# Patient Record
Sex: FEMALE | Race: WHITE | Employment: OTHER | URBAN - METROPOLITAN AREA
[De-identification: names, ages, dates, MRNs, and addresses within clinical notes are randomized per-mention and may not be internally consistent; named-entity substitution may affect disease eponyms.]

---

## 2017-05-22 ENCOUNTER — HOSPITAL ENCOUNTER (OUTPATIENT)
Dept: WOMENS IMAGING | Age: 72
Discharge: HOME | End: 2017-05-22
Attending: INTERNAL MEDICINE | Admitting: INTERNAL MEDICINE

## 2017-05-22 DIAGNOSIS — M85.9 DISORDER OF BONE DENSITY AND STRUCTURE, UNSPECIFIED: ICD-10-CM

## 2017-05-22 DIAGNOSIS — Z12.31 VISIT FOR SCREENING MAMMOGRAM: ICD-10-CM

## 2019-10-21 ENCOUNTER — TELEPHONE (OUTPATIENT)
Dept: CARDIOLOGY CLINIC | Age: 74
End: 2019-10-21

## 2019-10-29 ENCOUNTER — INITIAL CONSULT (OUTPATIENT)
Dept: CARDIOLOGY CLINIC | Age: 74
End: 2019-10-29
Payer: MEDICARE

## 2019-10-29 VITALS
HEART RATE: 88 BPM | SYSTOLIC BLOOD PRESSURE: 130 MMHG | HEIGHT: 68 IN | BODY MASS INDEX: 44.41 KG/M2 | DIASTOLIC BLOOD PRESSURE: 52 MMHG | WEIGHT: 293 LBS

## 2019-10-29 DIAGNOSIS — R60.0 BILATERAL LEG EDEMA: ICD-10-CM

## 2019-10-29 DIAGNOSIS — R06.02 SOB (SHORTNESS OF BREATH): Primary | ICD-10-CM

## 2019-10-29 PROCEDURE — G8417 CALC BMI ABV UP PARAM F/U: HCPCS | Performed by: INTERNAL MEDICINE

## 2019-10-29 PROCEDURE — 99204 OFFICE O/P NEW MOD 45 MIN: CPT | Performed by: INTERNAL MEDICINE

## 2019-10-29 PROCEDURE — G8427 DOCREV CUR MEDS BY ELIG CLIN: HCPCS | Performed by: INTERNAL MEDICINE

## 2019-10-29 PROCEDURE — 93000 ELECTROCARDIOGRAM COMPLETE: CPT | Performed by: INTERNAL MEDICINE

## 2019-10-29 PROCEDURE — 1090F PRES/ABSN URINE INCON ASSESS: CPT | Performed by: INTERNAL MEDICINE

## 2019-10-29 PROCEDURE — G8484 FLU IMMUNIZE NO ADMIN: HCPCS | Performed by: INTERNAL MEDICINE

## 2019-10-29 RX ORDER — INSULIN GLARGINE 100 [IU]/ML
30 INJECTION, SOLUTION SUBCUTANEOUS DAILY
COMMUNITY
Start: 2019-08-21

## 2019-10-29 SDOH — HEALTH STABILITY: MENTAL HEALTH: HOW OFTEN DO YOU HAVE A DRINK CONTAINING ALCOHOL?: NEVER

## 2019-11-06 ENCOUNTER — PROCEDURE VISIT (OUTPATIENT)
Dept: CARDIOLOGY CLINIC | Age: 74
End: 2019-11-06
Payer: MEDICARE

## 2019-11-06 DIAGNOSIS — R60.0 BILATERAL LEG EDEMA: ICD-10-CM

## 2019-11-06 DIAGNOSIS — R06.02 SOB (SHORTNESS OF BREATH): Primary | ICD-10-CM

## 2019-11-06 DIAGNOSIS — I73.9 PAD (PERIPHERAL ARTERY DISEASE) (HCC): Primary | ICD-10-CM

## 2019-11-06 LAB
LV EF: 58 %
LVEF MODALITY: NORMAL

## 2019-11-06 PROCEDURE — 93925 LOWER EXTREMITY STUDY: CPT | Performed by: INTERNAL MEDICINE

## 2019-11-06 PROCEDURE — 93306 TTE W/DOPPLER COMPLETE: CPT | Performed by: INTERNAL MEDICINE

## 2019-11-06 PROCEDURE — 93922 UPR/L XTREMITY ART 2 LEVELS: CPT | Performed by: INTERNAL MEDICINE

## 2019-11-11 ENCOUNTER — TELEPHONE (OUTPATIENT)
Dept: CARDIOLOGY CLINIC | Age: 74
End: 2019-11-11

## 2019-12-02 ENCOUNTER — OFFICE VISIT (OUTPATIENT)
Dept: CARDIOLOGY CLINIC | Age: 74
End: 2019-12-02
Payer: MEDICARE

## 2019-12-02 ENCOUNTER — TELEPHONE (OUTPATIENT)
Dept: CARDIOLOGY CLINIC | Age: 74
End: 2019-12-02

## 2019-12-02 VITALS
HEIGHT: 68 IN | BODY MASS INDEX: 44.41 KG/M2 | SYSTOLIC BLOOD PRESSURE: 150 MMHG | HEART RATE: 98 BPM | DIASTOLIC BLOOD PRESSURE: 60 MMHG | WEIGHT: 293 LBS

## 2019-12-02 DIAGNOSIS — I73.9 PAD (PERIPHERAL ARTERY DISEASE) (HCC): Primary | ICD-10-CM

## 2019-12-02 PROCEDURE — 1123F ACP DISCUSS/DSCN MKR DOCD: CPT | Performed by: INTERNAL MEDICINE

## 2019-12-02 PROCEDURE — 3017F COLORECTAL CA SCREEN DOC REV: CPT | Performed by: INTERNAL MEDICINE

## 2019-12-02 PROCEDURE — 4040F PNEUMOC VAC/ADMIN/RCVD: CPT | Performed by: INTERNAL MEDICINE

## 2019-12-02 PROCEDURE — G8484 FLU IMMUNIZE NO ADMIN: HCPCS | Performed by: INTERNAL MEDICINE

## 2019-12-02 PROCEDURE — G8399 PT W/DXA RESULTS DOCUMENT: HCPCS | Performed by: INTERNAL MEDICINE

## 2019-12-02 PROCEDURE — 1036F TOBACCO NON-USER: CPT | Performed by: INTERNAL MEDICINE

## 2019-12-02 PROCEDURE — 1090F PRES/ABSN URINE INCON ASSESS: CPT | Performed by: INTERNAL MEDICINE

## 2019-12-02 PROCEDURE — G8427 DOCREV CUR MEDS BY ELIG CLIN: HCPCS | Performed by: INTERNAL MEDICINE

## 2019-12-02 PROCEDURE — 99214 OFFICE O/P EST MOD 30 MIN: CPT | Performed by: INTERNAL MEDICINE

## 2019-12-02 PROCEDURE — G8417 CALC BMI ABV UP PARAM F/U: HCPCS | Performed by: INTERNAL MEDICINE

## 2019-12-02 RX ORDER — CILOSTAZOL 50 MG/1
50 TABLET ORAL 2 TIMES DAILY
Qty: 60 TABLET | Refills: 3 | Status: SHIPPED | OUTPATIENT
Start: 2019-12-02 | End: 2020-03-02 | Stop reason: SDUPTHER

## 2019-12-02 NOTE — TELEPHONE ENCOUNTER
Pt called back needs a later date so her daughter can come. Juliana Angio changed to 2-11-20 @ 10 AM. Pt to come in 2-10-20 to sign papers and then will go have pre-testing done.

## 2020-01-24 ENCOUNTER — TELEPHONE (OUTPATIENT)
Dept: CARDIOLOGY CLINIC | Age: 75
End: 2020-01-24

## 2020-01-28 ENCOUNTER — TELEPHONE (OUTPATIENT)
Dept: CARDIOLOGY CLINIC | Age: 75
End: 2020-01-28

## 2020-02-04 ENCOUNTER — TELEPHONE (OUTPATIENT)
Dept: CARDIOLOGY CLINIC | Age: 75
End: 2020-02-04

## 2020-02-10 ENCOUNTER — HOSPITAL ENCOUNTER (OUTPATIENT)
Dept: GENERAL RADIOLOGY | Age: 75
Discharge: HOME | End: 2020-02-10
Payer: MEDICARE

## 2020-02-10 ENCOUNTER — HOSPITAL ENCOUNTER (OUTPATIENT)
Age: 75
Discharge: HOME | End: 2020-02-10
Payer: MEDICARE

## 2020-02-10 ENCOUNTER — TELEPHONE (OUTPATIENT)
Dept: CARDIOLOGY CLINIC | Age: 75
End: 2020-02-10

## 2020-02-10 LAB
ABO/RH: NORMAL
ANION GAP SERPL CALCULATED.3IONS-SCNC: 14 MMOL/L (ref 4–16)
ANTIBODY SCREEN: NEGATIVE
APTT: 30.1 SECONDS (ref 25.1–37.1)
BASOPHILS ABSOLUTE: 0.1 K/CU MM
BASOPHILS RELATIVE PERCENT: 1 % (ref 0–1)
BUN BLDV-MCNC: 9 MG/DL (ref 6–23)
CALCIUM SERPL-MCNC: 9.7 MG/DL (ref 8.3–10.6)
CHLORIDE BLD-SCNC: 97 MMOL/L (ref 99–110)
CO2: 26 MMOL/L (ref 21–32)
COMMENT: NORMAL
CREAT SERPL-MCNC: 0.6 MG/DL (ref 0.6–1.1)
DIFFERENTIAL TYPE: ABNORMAL
EOSINOPHILS ABSOLUTE: 0.3 K/CU MM
EOSINOPHILS RELATIVE PERCENT: 5 % (ref 0–3)
GFR AFRICAN AMERICAN: >60 ML/MIN/1.73M2
GFR NON-AFRICAN AMERICAN: >60 ML/MIN/1.73M2
GLUCOSE BLD-MCNC: 272 MG/DL (ref 70–99)
HCT VFR BLD CALC: 45.6 % (ref 37–47)
HEMOGLOBIN: 13.9 GM/DL (ref 12.5–16)
IMMATURE NEUTROPHIL %: 0.5 % (ref 0–0.43)
INR BLD: 0.96 INDEX
LYMPHOCYTES ABSOLUTE: 2 K/CU MM
LYMPHOCYTES RELATIVE PERCENT: 32.4 % (ref 24–44)
MCH RBC QN AUTO: 29.4 PG (ref 27–31)
MCHC RBC AUTO-ENTMCNC: 30.5 % (ref 32–36)
MCV RBC AUTO: 96.6 FL (ref 78–100)
MONOCYTES ABSOLUTE: 0.5 K/CU MM
MONOCYTES RELATIVE PERCENT: 7.6 % (ref 0–4)
NUCLEATED RBC %: 0 %
PDW BLD-RTO: 13.3 % (ref 11.7–14.9)
PLATELET # BLD: 176 K/CU MM (ref 140–440)
PMV BLD AUTO: 11.3 FL (ref 7.5–11.1)
POTASSIUM SERPL-SCNC: 4.6 MMOL/L (ref 3.5–5.1)
PROTHROMBIN TIME: 11.6 SECONDS (ref 11.7–14.5)
RBC # BLD: 4.72 M/CU MM (ref 4.2–5.4)
SEGMENTED NEUTROPHILS ABSOLUTE COUNT: 3.2 K/CU MM
SEGMENTED NEUTROPHILS RELATIVE PERCENT: 53.5 % (ref 36–66)
SODIUM BLD-SCNC: 137 MMOL/L (ref 135–145)
TOTAL IMMATURE NEUTOROPHIL: 0.03 K/CU MM
TOTAL NUCLEATED RBC: 0 K/CU MM
WBC # BLD: 6 K/CU MM (ref 4–10.5)

## 2020-02-10 PROCEDURE — 85730 THROMBOPLASTIN TIME PARTIAL: CPT

## 2020-02-10 PROCEDURE — 85610 PROTHROMBIN TIME: CPT

## 2020-02-10 PROCEDURE — 85025 COMPLETE CBC W/AUTO DIFF WBC: CPT

## 2020-02-10 PROCEDURE — 86901 BLOOD TYPING SEROLOGIC RH(D): CPT

## 2020-02-10 PROCEDURE — 36415 COLL VENOUS BLD VENIPUNCTURE: CPT

## 2020-02-10 PROCEDURE — 71046 X-RAY EXAM CHEST 2 VIEWS: CPT

## 2020-02-10 PROCEDURE — 80048 BASIC METABOLIC PNL TOTAL CA: CPT

## 2020-02-10 PROCEDURE — 86900 BLOOD TYPING SEROLOGIC ABO: CPT

## 2020-02-10 PROCEDURE — 86850 RBC ANTIBODY SCREEN: CPT

## 2020-02-11 ENCOUNTER — HOSPITAL ENCOUNTER (OUTPATIENT)
Dept: CARDIAC CATH/INVASIVE PROCEDURES | Age: 75
Discharge: HOME | End: 2020-02-11
Attending: INTERNAL MEDICINE | Admitting: INTERNAL MEDICINE
Payer: MEDICARE

## 2020-02-11 VITALS
WEIGHT: 293 LBS | RESPIRATION RATE: 23 BRPM | OXYGEN SATURATION: 94 % | BODY MASS INDEX: 44.41 KG/M2 | DIASTOLIC BLOOD PRESSURE: 59 MMHG | SYSTOLIC BLOOD PRESSURE: 143 MMHG | HEART RATE: 104 BPM | HEIGHT: 68 IN

## 2020-02-11 LAB — GLUCOSE BLD-MCNC: 229 MG/DL (ref 70–99)

## 2020-02-11 PROCEDURE — 2580000003 HC RX 258: Performed by: INTERNAL MEDICINE

## 2020-02-11 PROCEDURE — 2500000003 HC RX 250 WO HCPCS

## 2020-02-11 PROCEDURE — 75625 CONTRAST EXAM ABDOMINL AORTA: CPT | Performed by: INTERNAL MEDICINE

## 2020-02-11 PROCEDURE — 6360000002 HC RX W HCPCS

## 2020-02-11 PROCEDURE — 6370000000 HC RX 637 (ALT 250 FOR IP): Performed by: INTERNAL MEDICINE

## 2020-02-11 PROCEDURE — 2709999900 HC NON-CHARGEABLE SUPPLY

## 2020-02-11 PROCEDURE — C1887 CATHETER, GUIDING: HCPCS

## 2020-02-11 PROCEDURE — C1894 INTRO/SHEATH, NON-LASER: HCPCS

## 2020-02-11 PROCEDURE — 36200 PLACE CATHETER IN AORTA: CPT | Performed by: INTERNAL MEDICINE

## 2020-02-11 PROCEDURE — 6360000004 HC RX CONTRAST MEDICATION

## 2020-02-11 PROCEDURE — 82962 GLUCOSE BLOOD TEST: CPT

## 2020-02-11 PROCEDURE — 75630 X-RAY AORTA LEG ARTERIES: CPT

## 2020-02-11 PROCEDURE — 75716 ARTERY X-RAYS ARMS/LEGS: CPT | Performed by: INTERNAL MEDICINE

## 2020-02-11 PROCEDURE — C1769 GUIDE WIRE: HCPCS

## 2020-02-11 PROCEDURE — 36200 PLACE CATHETER IN AORTA: CPT

## 2020-02-11 RX ORDER — DIPHENHYDRAMINE HCL 25 MG
25 TABLET ORAL ONCE
Status: COMPLETED | OUTPATIENT
Start: 2020-02-11 | End: 2020-02-11

## 2020-02-11 RX ORDER — SODIUM CHLORIDE 9 MG/ML
INJECTION, SOLUTION INTRAVENOUS CONTINUOUS
Status: DISCONTINUED | OUTPATIENT
Start: 2020-02-11 | End: 2020-02-11 | Stop reason: HOSPADM

## 2020-02-11 RX ORDER — DIAZEPAM 5 MG/1
5 TABLET ORAL ONCE
Status: COMPLETED | OUTPATIENT
Start: 2020-02-11 | End: 2020-02-11

## 2020-02-11 RX ADMIN — DIPHENHYDRAMINE HYDROCHLORIDE 25 MG: 25 TABLET ORAL at 09:34

## 2020-02-11 RX ADMIN — SODIUM CHLORIDE: 9 INJECTION, SOLUTION INTRAVENOUS at 09:34

## 2020-02-11 RX ADMIN — DIAZEPAM 5 MG: 5 TABLET ORAL at 09:34

## 2020-02-11 NOTE — H&P
affect     No results found for: CKTOTAL, CKMB, CKMBINDEX, TROPONINI  BNP:  No results found for: BNP  PT/INR:  No results found for: PTINR        Lab Results   Component Value Date     LABA1C 8.3 (H) 10/22/2013     LABA1C 10.0 (H) 07/24/2013            Lab Results   Component Value Date     CHOL 111 10/22/2013     TRIG 183 (H) 10/22/2013     HDL 39 (L) 10/22/2013     LDLCALC 35 10/22/2013     LDLDIRECT 59 01/23/2013            Lab Results   Component Value Date     ALT 20 10/22/2013     AST 21 10/22/2013      TSH:  No results found for: TSH     Impression:  Esperanza Harrison is a 75 y. o.year old who  has a past medical history of COPD (chronic obstructive pulmonary disease) ([facility] [address] H/O Doppler LOWER ARTERIAL ultrasound, H/O echocardiogram, Hypertension, Polycythemia, Respiratory failure, chronic ([facility] [address] and Type II or unspecified type diabetes mellitus without mention of complication, not stated as uncontrolled. and presents with      Plan:  1. Leg swelling\": recommend compression socks, will gets records from 's office  2. PAD: arterial doppler SHOWED MONOPHASIC left sided blood flow, with AUDREY 0.57, will get lower extremity angiogram, add pletal  3. Mild AS: will watch for  4. Copd: on 24 hrs home oxygen, will get echo to check LV ef and PA pressures  5. HTN: stable  6. DM: stable/  7.  All labs, medications and tests reviewed, continue all other medications of all above

## 2020-02-13 ENCOUNTER — TELEPHONE (OUTPATIENT)
Dept: CARDIOLOGY CLINIC | Age: 75
End: 2020-02-13

## 2020-03-02 ENCOUNTER — OFFICE VISIT (OUTPATIENT)
Dept: CARDIOLOGY CLINIC | Age: 75
End: 2020-03-02
Payer: MEDICARE

## 2020-03-02 VITALS
SYSTOLIC BLOOD PRESSURE: 148 MMHG | HEART RATE: 94 BPM | WEIGHT: 293 LBS | BODY MASS INDEX: 44.41 KG/M2 | DIASTOLIC BLOOD PRESSURE: 68 MMHG | HEIGHT: 68 IN

## 2020-03-02 PROCEDURE — G8427 DOCREV CUR MEDS BY ELIG CLIN: HCPCS | Performed by: INTERNAL MEDICINE

## 2020-03-02 PROCEDURE — 1036F TOBACCO NON-USER: CPT | Performed by: INTERNAL MEDICINE

## 2020-03-02 PROCEDURE — 1090F PRES/ABSN URINE INCON ASSESS: CPT | Performed by: INTERNAL MEDICINE

## 2020-03-02 PROCEDURE — G8417 CALC BMI ABV UP PARAM F/U: HCPCS | Performed by: INTERNAL MEDICINE

## 2020-03-02 PROCEDURE — G8399 PT W/DXA RESULTS DOCUMENT: HCPCS | Performed by: INTERNAL MEDICINE

## 2020-03-02 PROCEDURE — 1123F ACP DISCUSS/DSCN MKR DOCD: CPT | Performed by: INTERNAL MEDICINE

## 2020-03-02 PROCEDURE — G8484 FLU IMMUNIZE NO ADMIN: HCPCS | Performed by: INTERNAL MEDICINE

## 2020-03-02 PROCEDURE — 93000 ELECTROCARDIOGRAM COMPLETE: CPT | Performed by: INTERNAL MEDICINE

## 2020-03-02 PROCEDURE — 4040F PNEUMOC VAC/ADMIN/RCVD: CPT | Performed by: INTERNAL MEDICINE

## 2020-03-02 PROCEDURE — 3017F COLORECTAL CA SCREEN DOC REV: CPT | Performed by: INTERNAL MEDICINE

## 2020-03-02 PROCEDURE — 99214 OFFICE O/P EST MOD 30 MIN: CPT | Performed by: INTERNAL MEDICINE

## 2020-03-02 RX ORDER — CILOSTAZOL 50 MG/1
50 TABLET ORAL 2 TIMES DAILY
Qty: 60 TABLET | Refills: 3 | Status: SHIPPED | OUTPATIENT
Start: 2020-03-02

## 2020-03-02 NOTE — LETTER
CLINICAL STAFF DOCUMENTATION    Ember Bray  1945  [id number]    Have you had any Chest Pain - No    Have you had any Shortness of Breath - No    Have you had any dizziness - No    Have you had any palpitations - No    Do you have any edema - No     Do you have a surgery or procedure scheduled in the near future - Yes  If Yes- DO EKG  If Yes - Who is the surgery with?  Mellisa  Type of surgery Atherectomy   BE SURE TO GIVE THIS INFORMATION to [facility]    Ask patient if they [facility] to [facility] if they are not already signed up    Check to see if we have an E-MAIL on file for the patient    Check medication list thoroughly!!!  BE SURE TO ASK PATIENT IF THEY NEED MEDICATION REFILLS
and/or other life saving measures, if I have a cardiac or respiratory arrest.    ___ I WANT to keep my DNR in effect during my procedure(s) and immediate post-operative recovery period through Phase 2 recovery. (Complete separate refusal form)     This form has been fully explained to me. I understand its contents.       Patients Signature: ___________________________Date: ________  Time: ________    If patient unable to sign, has engaged the [address] [place] [address], is a minor, or has a court-appointed Guardian:  [facility] Representative Name (Print):  ____________________________________      Relationship ([place] one):    Guardian   Parent    Spouse    HCPOA   Child   Sibling  Next-of-Kin Friend    Patients Representative Signature: _______________________________________              Date: ______________  Time: __________    [facility] Witness________________________  Date: ________   Time: _________    Physician/Practitioner _______________________  Date: ________   Time: _________           Revision 2017      Maria Elena Ochoa    PROCEDURE TO SCHEDULE:    ANGIOPLASTY OF LEFT ILIAC ARTERY AND LEFT COMMOM FEMORAL ARTERY    Patient Name: Nadine Rodarte   : 1945   MRN# [id number]    Home Phone Number: [phone]   Weight:    Wt Readings from Last 3 Encounters:   20 (!) 333 lb 12.8 oz (151.4 kg)   20 300 lb (136.1 kg)   19 (!) 338 lb (153.3 kg)        Insurance: Payor: Kayley Loera / Plan: MEDICARE PART A AND B / Product Type: *No Product type* /     Date of Procedure: 3/19/20 Time: 11 Arrival Time: 9 am    Diagnosis:  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

## 2020-03-02 NOTE — PROGRESS NOTES
ultrasound 2019    The Left CFA exhibits calcific plaquing with elevated PSV,  Right ABIs show Mild PAD. The Left AUDREY shows Moderate peripheral arterial disease.  H/O echocardiogram 2019    EF 55-60%, Mild aortic stenosis with mean gradient of 13 mmHg.  Hypertension     PAD (peripheral artery disease) Periph Angio([facility]) 2020     Severe left inflow stenosis of left common iliac and left common  femoral artery, will plan CSI atherectomy.     Polycythemia     Respiratory failure, chronic (HCC)     Type II or unspecified type diabetes mellitus without mention of complication, not stated as uncontrolled      Past Surgical History:   Procedure Laterality Date    GALLBLADDER SURGERY  1971     Family History   Problem Relation Age of Onset    Diabetes Mother     Hypertension Mother     Diabetes Father     Hypertension Father     Heart Attack Father     Diabetes Sister     Hypertension Sister     Breast Cancer Sister     Diabetes Brother     Hypertension Brother     Heart Attack Brother     Cancer Paternal Grandmother      Social History     Tobacco Use    Smoking status: Former Smoker     Packs/day: 0.25     Years: 0.50     Pack years: 0.12     Types: Cigarettes     Last attempt to quit: 2009     Years since quittin.1    Smokeless tobacco: Never Used   Substance Use Topics    Alcohol use: Never     Alcohol/week: 0.0 standard drinks     Frequency: Never      [unfilled]  Review of Systems:   · Constitutional: No Fever or Weight Loss   · Eyes: No Decreased Vision  · ENT: No Headaches, Hearing Loss or Vertigo  · Cardiovascular: No chest pain, dyspnea on exertion, palpitations or loss of consciousness  · Respiratory: No cough or wheezing    · Gastrointestinal: No abdominal pain, appetite loss, blood in stools, constipation, diarrhea or heartburn  · Genitourinary: No dysuria, trouble voiding, or hematuria  · Musculoskeletal:  No gait disturbance, weakness or joint complaints  · Integumentary: No rash or pruritis  · Neurological: No TIA or stroke symptoms  · Psychiatric: No anxiety or depression  · Endocrine: No malaise, fatigue or temperature intolerance  · Hematologic/Lymphatic: No bleeding problems, blood clots or swollen lymph nodes  · Allergic/Immunologic: No nasal congestion or hives  All systems negative except as marked. Objective:  BP (!) 148/68   Pulse 94   Ht 5' 8\" (1.727 m)   Wt (!) 333 lb 12.8 oz (151.4 kg)   BMI 50.75 kg/m²   Wt Readings from Last 3 Encounters:   03/02/20 (!) 333 lb 12.8 oz (151.4 kg)   02/11/20 300 lb (136.1 kg)   12/09/19 (!) 338 lb (153.3 kg)     Body mass index is 50.75 kg/m². GENERAL - Alert, oriented, pleasant, in no apparent distress,normal grooming  HEENT - pupils are reactive to light and accomodation, cornea intact, conjunctive normal color, ears are normal in exam,throat exam in normal, teeth, gum and palate are normal, oral mucosa is normal without any notation of pallor or cyanosis  Neck - Supple. No jugular venous distention noted. No carotid bruits, no apical -carotid delay  Respiratory:  Normal breath sounds, No respiratory distress, No wheezing, No chest tenderness. ,no use of accessory muscles, diaphragm movement is normal  Cardiovascular: (PMI) apex non displaced,no lifts no thrills, no s3,no s4, Normal heart rate, Normal rhythm, No murmurs, No rubs, No gallops.  Carotid arteries pulse and amplitude are normal no bruit, no abdominal bruit noted ( normal abdominal aorta ausculation), femoral arteries pulse and amplitude are normal no bruit, pedal pulses are normal  Femoral pulses have normal amplitude, no bruits   Extremities - No cyanosis, clubbing, or significant edema, no varicose veins    Abdomen - No masses, tenderness, or organomegaly, no hepato-splenomegally, no bruits  Musculoskeletal - No significant edema, no kyphosis or scoliosis, no deformity in any extremity noted, muscle strength and tone are normal  Skin: no ulcer,no scar,no stasis dermatitis   Neurologic - alert oriented times 3,Cranial nerves II through XII are grossly intact. There were no gross focal neurologic abnormalities. All sensory and motor nerves examined and were normal  Psychiatric: normal mood and affect    No results found for: CKTOTAL, CKMB, CKMBINDEX, TROPONINI  BNP:  No results found for: BNP  PT/INR:  No results found for: PTINR  Lab Results   Component Value Date    LABA1C 8.3 (H) 10/22/2013    LABA1C 10.0 (H) 07/24/2013     Lab Results   Component Value Date    CHOL 111 10/22/2013    TRIG 183 (H) 10/22/2013    HDL 39 (L) 10/22/2013    LDLCALC 35 10/22/2013    LDLDIRECT 59 01/23/2013     Lab Results   Component Value Date    ALT 20 10/22/2013    AST 21 10/22/2013     TSH:  No results found for: TSH    Impression:  Rosie Torres is a 75 y. o.year old who  has a past medical history of COPD (chronic obstructive pulmonary disease) ([facility] [address] H/O Doppler LOWER ARTERIAL ultrasound, H/O echocardiogram, Hypertension, PAD (peripheral artery disease) Periph Angio(HCC), Polycythemia, Respiratory failure, chronic ([address] and Type II or unspecified type diabetes mellitus without mention of complication, not stated as uncontrolled. and presents with     Plan:  1. Leg swelling\": recommend compression socks, will gets records from 's office  2. PAD: arterial doppler SHOWED MONOPHASIC left sided blood flow, with AUDREY 0.57, lower extermity angopgram showed severe left iilac stenosis and left common femoral stenosis, will get atherectomy and or cutting balloon angioplast  3. Mild AS: will watch for  4. Copd: on 24 hrs home oxygen, will get echo to check LV ef and PA pressures  5. HTN: stable  6. DM: stable/All labs, medications and tests reviewed, continue all other medications of all above medical condition listed as is.     [unfilled]

## 2020-03-02 NOTE — PROGRESS NOTES
Pt here in office & educated on angioplasty for Dx: stenosis of L Iliac artery & L CFA. , scheduled for 3/19/20  @ 11, w/arrival @ 9, @ [facility]; risks explained; & consents signed. Pt does not need pre testing again per Dr. Bridget Ayala. Instructions given to pt to:  NPO after midnight night before procedure; Call hospital @ [phone] to pre-register; May take rest of morning meds. am of procedure; & pt voiced understanding. Copies of consent, pre-testing orders, & info. sheet given to Arlen.

## 2020-03-03 NOTE — LETTER
[facility]) Informed Consent for Anesthesia/Sedation, Surgery, Invasive Procedures, and other High-risk Interventions and Medication use      *This consent is applicable for 30 days following patient signature*    Procedure(s)   IEmber authorize, Dr. Lexi Baker    and the associate(s) or assistant(s) of his/her choice, to perform the following procedure(s): PERCUTANEOUS TRANSLUMINAL ANGIOPLASTY OF LEFT ILIAC ARTERY AND LEFT COMMON FEMORAL ARTERY       I know that unexpected conditions may require additional or different procedures than those above. I authorize the above named practitioner(s) perform these as necessary and desirable. This is based on the practitioners professional judgment. The above named practitioner has discussed the above procedure(s) with me, including:  ? Potential benefits, including likelihood of success of the procedure(s) goals  ? Risks  ? Side effects, risk of death, and risk of infection  ? Any potential problems that might occur during recuperation or healing post-procedure  ? Reasonable alternatives  ? Risks of NOT performing the procedure(s)    I acknowledge that no warranty or guarantee has been made to the results the procedure(s). I consent to the above named practitioner(s) providing additional services to me as deemed reasonable and necessary, including but not limited to:    ? Use of medications for anesthesia or sedation. ? All anesthesia and sedation carry risks. My practitioner has discussed my anticipated anesthesia and/or sedation and the risks of using, risk of not using, benefits, side effects, and alternatives. ? Use of pathology  ? I authorize [facility]) to dispose of tissues, specimens or organs when pathology is complete. ? Use of radiology  ? A contrast agent may be required for radiology procedures.   My practitioner has advised me of the risks of using, risks of not using, benefits, side effects, and alternatives. ? Observers or use of photography, video/audio recording, or televising of the procedure(s). This is for medical, scientific, or educational purposes. This includes appropriate portions of my body. My identity will not be revealed. ? I consent to release of my social security number and other identifying information to [employer] (FDA), and the supplier/, if I receive tissue, a device, or implant. This is to track the tissue, device, or implant for defect, recall, infection, etc.     ? Use of blood and/or blood products, if needed, through my hospital stay. My practitioner has advised me of the risks of using, risks of not using, benefits, side effects, and alternatives. ___ I do NOT want Blood or Blood products given. (Complete separate  refusal form)    Code Status (sumit one):  ___ I do NOT HAVE a DNR order. I am a Full-code.   I will receive CPR, intubation,  chest compressions, medications, and/or other life saving measures if I have a  cardiac or respiratory arrest.    ___ I have a Do Not Resuscitate (DNR)order.   (sumit one below)  ___  I rescind my DNR for surgery and immediate post-operative period through Phase 2 recovery. This means, for that time period, I will be a Full-code and receive CPR, intubation, chest compressions, medications, and/or other life saving measures, if I have a cardiac or respiratory arrest.    ___ I WANT to keep my DNR in effect during my procedure(s) and immediate post-operative recovery period through Phase 2 recovery. (Complete separate refusal form)     This form has been fully explained to me. I understand its contents.     Patients Signature: ___________________________Date: ________  Time: ________    If patient unable to sign, has engaged the Healthcare Power of , is a minor, or has a court-appointed Guardian:

## 2020-03-04 ENCOUNTER — TELEPHONE (OUTPATIENT)
Dept: CARDIOLOGY CLINIC | Age: 75
End: 2020-03-04

## 2020-03-19 ENCOUNTER — TELEPHONE (OUTPATIENT)
Dept: CARDIOLOGY CLINIC | Age: 75
End: 2020-03-19

## 2020-03-19 NOTE — TELEPHONE ENCOUNTER
Patient called to cancel procedure for 3/24/20 due to COVID 19 concerns. Advised patient we will call her to reschedule once we have all clear. She voiced understanding. Zhanna at [address] [place] [address] Scheduling notified of cancellation, Dr Ute Giraldo notified via perfect serve. Routing to [place] for notification.

## 2021-06-23 ENCOUNTER — HOSPITAL ENCOUNTER (OUTPATIENT)
Dept: CT IMAGING | Age: 76
Discharge: HOME | End: 2021-06-23
Payer: MEDICARE

## 2021-06-23 DIAGNOSIS — E11.65 UNCONTROLLED TYPE 2 DIABETES MELLITUS WITH HYPERGLYCEMIA (HCC): ICD-10-CM

## 2021-06-23 DIAGNOSIS — R42 DIZZINESS AND GIDDINESS: ICD-10-CM

## 2021-06-23 LAB
GFR AFRICAN AMERICAN: >60 ML/MIN/1.73M2
GFR NON-AFRICAN AMERICAN: >60 ML/MIN/1.73M2
POC CREATININE: 0.9 MG/DL (ref 0.6–1.1)

## 2021-06-23 PROCEDURE — 6360000004 HC RX CONTRAST MEDICATION: Performed by: INTERNAL MEDICINE

## 2021-06-23 PROCEDURE — 2580000003 HC RX 258: Performed by: INTERNAL MEDICINE

## 2021-06-23 PROCEDURE — 70470 CT HEAD/BRAIN W/O & W/DYE: CPT

## 2021-06-23 RX ORDER — SODIUM CHLORIDE 0.9 % (FLUSH) 0.9 %
10 SYRINGE (ML) INJECTION PRN
Status: DISCONTINUED | OUTPATIENT
Start: 2021-06-23 | End: 2021-06-24 | Stop reason: HOSPADM

## 2021-06-23 RX ADMIN — SODIUM CHLORIDE, PRESERVATIVE FREE 10 ML: 5 INJECTION INTRAVENOUS at 09:00

## 2021-06-23 RX ADMIN — IOPAMIDOL 75 ML: 755 INJECTION, SOLUTION INTRAVENOUS at 09:07

## 2021-10-15 ENCOUNTER — HOSPITAL ENCOUNTER (OUTPATIENT)
Age: 76
Discharge: HOME | End: 2021-10-15
Payer: MEDICARE

## 2021-10-15 ENCOUNTER — HOSPITAL ENCOUNTER (OUTPATIENT)
Dept: ULTRASOUND IMAGING | Age: 76
Discharge: HOME | End: 2021-10-15
Payer: MEDICARE

## 2021-10-15 ENCOUNTER — HOSPITAL ENCOUNTER (OUTPATIENT)
Dept: GENERAL RADIOLOGY | Age: 76
Discharge: HOME | End: 2021-10-15
Payer: MEDICARE

## 2021-10-15 DIAGNOSIS — M79.605 LEFT LEG PAIN: ICD-10-CM

## 2021-10-15 PROCEDURE — 93971 EXTREMITY STUDY: CPT

## 2021-10-15 PROCEDURE — 73610 X-RAY EXAM OF ANKLE: CPT

## 2022-01-01 ENCOUNTER — OFFICE VISIT (OUTPATIENT)
Dept: CARDIOLOGY CLINIC | Age: 77
End: 2022-01-01
Payer: MEDICARE

## 2022-01-01 ENCOUNTER — HOSPITAL ENCOUNTER (INPATIENT)
Age: 77
LOS: 8 days | DRG: 308 | End: 2022-11-01
Attending: EMERGENCY MEDICINE | Admitting: STUDENT IN AN ORGANIZED HEALTH CARE EDUCATION/TRAINING PROGRAM
Payer: MEDICARE

## 2022-01-01 ENCOUNTER — APPOINTMENT (OUTPATIENT)
Dept: GENERAL RADIOLOGY | Age: 77
DRG: 308 | End: 2022-01-01
Payer: MEDICARE

## 2022-01-01 VITALS
WEIGHT: 293 LBS | SYSTOLIC BLOOD PRESSURE: 110 MMHG | BODY MASS INDEX: 44.41 KG/M2 | DIASTOLIC BLOOD PRESSURE: 62 MMHG | HEIGHT: 68 IN | HEART RATE: 107 BPM | OXYGEN SATURATION: 95 %

## 2022-01-01 VITALS
RESPIRATION RATE: 28 BRPM | HEIGHT: 68 IN | TEMPERATURE: 97.8 F | SYSTOLIC BLOOD PRESSURE: 115 MMHG | OXYGEN SATURATION: 95 % | BODY MASS INDEX: 44.41 KG/M2 | WEIGHT: 293 LBS | DIASTOLIC BLOOD PRESSURE: 68 MMHG | HEART RATE: 68 BPM

## 2022-01-01 DIAGNOSIS — I10 PRIMARY HYPERTENSION: ICD-10-CM

## 2022-01-01 DIAGNOSIS — E66.01 CLASS 3 SEVERE OBESITY DUE TO EXCESS CALORIES WITH SERIOUS COMORBIDITY AND BODY MASS INDEX (BMI) OF 50.0 TO 59.9 IN ADULT (HCC): ICD-10-CM

## 2022-01-01 DIAGNOSIS — I48.91 NEW ONSET A-FIB (HCC): ICD-10-CM

## 2022-01-01 DIAGNOSIS — I48.91 ATRIAL FIBRILLATION WITH RAPID VENTRICULAR RESPONSE (HCC): Primary | ICD-10-CM

## 2022-01-01 DIAGNOSIS — E78.2 MIXED HYPERLIPIDEMIA: ICD-10-CM

## 2022-01-01 DIAGNOSIS — E83.42 HYPOMAGNESEMIA: ICD-10-CM

## 2022-01-01 DIAGNOSIS — E87.70 HYPERVOLEMIA, UNSPECIFIED HYPERVOLEMIA TYPE: ICD-10-CM

## 2022-01-01 DIAGNOSIS — I48.91 ATRIAL FIBRILLATION WITH RVR (HCC): ICD-10-CM

## 2022-01-01 DIAGNOSIS — I73.9 PAD (PERIPHERAL ARTERY DISEASE) (HCC): Primary | ICD-10-CM

## 2022-01-01 DIAGNOSIS — R06.02 SOB (SHORTNESS OF BREATH): ICD-10-CM

## 2022-01-01 LAB
ALBUMIN SERPL-MCNC: 4.1 GM/DL (ref 3.4–5)
ALP BLD-CCNC: 46 IU/L (ref 40–129)
ALT SERPL-CCNC: 10 U/L (ref 10–40)
ANION GAP SERPL CALCULATED.3IONS-SCNC: 11 MMOL/L (ref 4–16)
ANION GAP SERPL CALCULATED.3IONS-SCNC: 11 MMOL/L (ref 4–16)
ANION GAP SERPL CALCULATED.3IONS-SCNC: 12 MMOL/L (ref 4–16)
ANION GAP SERPL CALCULATED.3IONS-SCNC: 12 MMOL/L (ref 4–16)
ANION GAP SERPL CALCULATED.3IONS-SCNC: 7 MMOL/L (ref 4–16)
ANION GAP SERPL CALCULATED.3IONS-SCNC: 9 MMOL/L (ref 4–16)
APTT: 30.4 SECONDS (ref 25.1–37.1)
APTT: 35.1 SECONDS (ref 25.1–37.1)
APTT: 35.4 SECONDS (ref 25.1–37.1)
APTT: 60 SECONDS (ref 25.1–37.1)
APTT: 81.5 SECONDS (ref 25.1–37.1)
AST SERPL-CCNC: 15 IU/L (ref 15–37)
BASOPHILS ABSOLUTE: 0 K/CU MM
BASOPHILS ABSOLUTE: 0.1 K/CU MM
BASOPHILS RELATIVE PERCENT: 0.3 % (ref 0–1)
BASOPHILS RELATIVE PERCENT: 0.5 % (ref 0–1)
BASOPHILS RELATIVE PERCENT: 0.5 % (ref 0–1)
BASOPHILS RELATIVE PERCENT: 0.7 % (ref 0–1)
BILIRUB SERPL-MCNC: 0.7 MG/DL (ref 0–1)
BUN BLDV-MCNC: 10 MG/DL (ref 6–23)
BUN BLDV-MCNC: 11 MG/DL (ref 6–23)
BUN BLDV-MCNC: 11 MG/DL (ref 6–23)
BUN BLDV-MCNC: 14 MG/DL (ref 6–23)
CALCIUM SERPL-MCNC: 8.9 MG/DL (ref 8.3–10.6)
CALCIUM SERPL-MCNC: 9.2 MG/DL (ref 8.3–10.6)
CALCIUM SERPL-MCNC: 9.2 MG/DL (ref 8.3–10.6)
CALCIUM SERPL-MCNC: 9.4 MG/DL (ref 8.3–10.6)
CALCIUM SERPL-MCNC: 9.4 MG/DL (ref 8.3–10.6)
CALCIUM SERPL-MCNC: 9.5 MG/DL (ref 8.3–10.6)
CHLORIDE BLD-SCNC: 100 MMOL/L (ref 99–110)
CHLORIDE BLD-SCNC: 101 MMOL/L (ref 99–110)
CHLORIDE BLD-SCNC: 91 MMOL/L (ref 99–110)
CHLORIDE BLD-SCNC: 91 MMOL/L (ref 99–110)
CHLORIDE BLD-SCNC: 93 MMOL/L (ref 99–110)
CHLORIDE BLD-SCNC: 96 MMOL/L (ref 99–110)
CO2: 25 MMOL/L (ref 21–32)
CO2: 26 MMOL/L (ref 21–32)
CO2: 29 MMOL/L (ref 21–32)
CO2: 32 MMOL/L (ref 21–32)
CO2: 34 MMOL/L (ref 21–32)
CO2: 39 MMOL/L (ref 21–32)
CREAT SERPL-MCNC: 0.7 MG/DL (ref 0.6–1.1)
CREAT SERPL-MCNC: 0.8 MG/DL (ref 0.6–1.1)
CREAT SERPL-MCNC: 0.8 MG/DL (ref 0.6–1.1)
CREAT SERPL-MCNC: 0.9 MG/DL (ref 0.6–1.1)
DIFFERENTIAL TYPE: ABNORMAL
EKG ATRIAL RATE: 163 BPM
EKG ATRIAL RATE: 95 BPM
EKG DIAGNOSIS: NORMAL
EKG DIAGNOSIS: NORMAL
EKG P AXIS: 67 DEGREES
EKG P-R INTERVAL: 260 MS
EKG Q-T INTERVAL: 274 MS
EKG Q-T INTERVAL: 290 MS
EKG QRS DURATION: 64 MS
EKG QRS DURATION: 82 MS
EKG QTC CALCULATION (BAZETT): 344 MS
EKG QTC CALCULATION (BAZETT): 470 MS
EKG R AXIS: 8 DEGREES
EKG R AXIS: 92 DEGREES
EKG T AXIS: 209 DEGREES
EKG T AXIS: 67 DEGREES
EKG VENTRICULAR RATE: 158 BPM
EKG VENTRICULAR RATE: 95 BPM
EOSINOPHILS ABSOLUTE: 0.1 K/CU MM
EOSINOPHILS ABSOLUTE: 0.1 K/CU MM
EOSINOPHILS ABSOLUTE: 0.2 K/CU MM
EOSINOPHILS ABSOLUTE: 0.2 K/CU MM
EOSINOPHILS RELATIVE PERCENT: 1 % (ref 0–3)
EOSINOPHILS RELATIVE PERCENT: 1.5 % (ref 0–3)
EOSINOPHILS RELATIVE PERCENT: 2.6 % (ref 0–3)
EOSINOPHILS RELATIVE PERCENT: 2.7 % (ref 0–3)
ESTIMATED AVERAGE GLUCOSE: 120 MG/DL
GFR SERPL CREATININE-BSD FRML MDRD: >60 ML/MIN/1.73M2
GLUCOSE BLD-MCNC: 118 MG/DL (ref 70–99)
GLUCOSE BLD-MCNC: 129 MG/DL (ref 70–99)
GLUCOSE BLD-MCNC: 138 MG/DL (ref 70–99)
GLUCOSE BLD-MCNC: 140 MG/DL (ref 70–99)
GLUCOSE BLD-MCNC: 142 MG/DL (ref 70–99)
GLUCOSE BLD-MCNC: 143 MG/DL (ref 70–99)
GLUCOSE BLD-MCNC: 146 MG/DL (ref 70–99)
GLUCOSE BLD-MCNC: 147 MG/DL (ref 70–99)
GLUCOSE BLD-MCNC: 147 MG/DL (ref 70–99)
GLUCOSE BLD-MCNC: 148 MG/DL (ref 70–99)
GLUCOSE BLD-MCNC: 149 MG/DL (ref 70–99)
GLUCOSE BLD-MCNC: 149 MG/DL (ref 70–99)
GLUCOSE BLD-MCNC: 150 MG/DL (ref 70–99)
GLUCOSE BLD-MCNC: 151 MG/DL (ref 70–99)
GLUCOSE BLD-MCNC: 156 MG/DL (ref 70–99)
GLUCOSE BLD-MCNC: 156 MG/DL (ref 70–99)
GLUCOSE BLD-MCNC: 159 MG/DL (ref 70–99)
GLUCOSE BLD-MCNC: 159 MG/DL (ref 70–99)
GLUCOSE BLD-MCNC: 160 MG/DL (ref 70–99)
GLUCOSE BLD-MCNC: 161 MG/DL (ref 70–99)
GLUCOSE BLD-MCNC: 162 MG/DL (ref 70–99)
GLUCOSE BLD-MCNC: 164 MG/DL (ref 70–99)
GLUCOSE BLD-MCNC: 164 MG/DL (ref 70–99)
GLUCOSE BLD-MCNC: 171 MG/DL (ref 70–99)
GLUCOSE BLD-MCNC: 175 MG/DL (ref 70–99)
GLUCOSE BLD-MCNC: 175 MG/DL (ref 70–99)
GLUCOSE BLD-MCNC: 178 MG/DL (ref 70–99)
GLUCOSE BLD-MCNC: 181 MG/DL (ref 70–99)
GLUCOSE BLD-MCNC: 184 MG/DL (ref 70–99)
GLUCOSE BLD-MCNC: 185 MG/DL (ref 70–99)
GLUCOSE BLD-MCNC: 186 MG/DL (ref 70–99)
GLUCOSE BLD-MCNC: 188 MG/DL (ref 70–99)
GLUCOSE BLD-MCNC: 189 MG/DL (ref 70–99)
GLUCOSE BLD-MCNC: 190 MG/DL (ref 70–99)
GLUCOSE BLD-MCNC: 194 MG/DL (ref 70–99)
GLUCOSE BLD-MCNC: 195 MG/DL (ref 70–99)
GLUCOSE BLD-MCNC: 198 MG/DL (ref 70–99)
GLUCOSE BLD-MCNC: 201 MG/DL (ref 70–99)
GLUCOSE BLD-MCNC: 209 MG/DL (ref 70–99)
GLUCOSE BLD-MCNC: 211 MG/DL (ref 70–99)
GLUCOSE BLD-MCNC: 220 MG/DL (ref 70–99)
GLUCOSE BLD-MCNC: 229 MG/DL (ref 70–99)
GLUCOSE BLD-MCNC: 235 MG/DL (ref 70–99)
GLUCOSE BLD-MCNC: 239 MG/DL (ref 70–99)
GLUCOSE BLD-MCNC: 239 MG/DL (ref 70–99)
GLUCOSE BLD-MCNC: 352 MG/DL (ref 70–99)
GLUCOSE BLD-MCNC: 571 MG/DL (ref 70–99)
GLUCOSE BLD-MCNC: 83 MG/DL (ref 70–99)
HBA1C MFR BLD: 5.8 % (ref 4.2–6.3)
HCT VFR BLD CALC: 39.6 % (ref 37–47)
HCT VFR BLD CALC: 40.2 % (ref 37–47)
HCT VFR BLD CALC: 40.3 % (ref 37–47)
HCT VFR BLD CALC: 41.3 % (ref 37–47)
HCT VFR BLD CALC: 41.3 % (ref 37–47)
HEMOGLOBIN: 12.4 GM/DL (ref 12.5–16)
HEMOGLOBIN: 12.5 GM/DL (ref 12.5–16)
HEMOGLOBIN: 12.6 GM/DL (ref 12.5–16)
HEMOGLOBIN: 12.6 GM/DL (ref 12.5–16)
HEMOGLOBIN: 12.9 GM/DL (ref 12.5–16)
IMMATURE NEUTROPHIL %: 0.3 % (ref 0–0.43)
IMMATURE NEUTROPHIL %: 0.4 % (ref 0–0.43)
INR BLD: 1.08 INDEX
LV EF: 53 %
LV EF: 58 %
LVEF MODALITY: NORMAL
LVEF MODALITY: NORMAL
LYMPHOCYTES ABSOLUTE: 1.4 K/CU MM
LYMPHOCYTES ABSOLUTE: 1.5 K/CU MM
LYMPHOCYTES ABSOLUTE: 1.6 K/CU MM
LYMPHOCYTES ABSOLUTE: 2 K/CU MM
LYMPHOCYTES RELATIVE PERCENT: 18.4 % (ref 24–44)
LYMPHOCYTES RELATIVE PERCENT: 18.9 % (ref 24–44)
LYMPHOCYTES RELATIVE PERCENT: 20.8 % (ref 24–44)
LYMPHOCYTES RELATIVE PERCENT: 26 % (ref 24–44)
MAGNESIUM: 1.5 MG/DL (ref 1.8–2.4)
MAGNESIUM: 1.6 MG/DL (ref 1.8–2.4)
MAGNESIUM: 1.6 MG/DL (ref 1.8–2.4)
MCH RBC QN AUTO: 28.7 PG (ref 27–31)
MCH RBC QN AUTO: 28.9 PG (ref 27–31)
MCH RBC QN AUTO: 29 PG (ref 27–31)
MCH RBC QN AUTO: 29.4 PG (ref 27–31)
MCH RBC QN AUTO: 29.7 PG (ref 27–31)
MCHC RBC AUTO-ENTMCNC: 30 % (ref 32–36)
MCHC RBC AUTO-ENTMCNC: 31.2 % (ref 32–36)
MCHC RBC AUTO-ENTMCNC: 31.3 % (ref 32–36)
MCHC RBC AUTO-ENTMCNC: 31.3 % (ref 32–36)
MCHC RBC AUTO-ENTMCNC: 31.6 % (ref 32–36)
MCV RBC AUTO: 92.6 FL (ref 78–100)
MCV RBC AUTO: 92.6 FL (ref 78–100)
MCV RBC AUTO: 93.9 FL (ref 78–100)
MCV RBC AUTO: 94.1 FL (ref 78–100)
MCV RBC AUTO: 95.6 FL (ref 78–100)
MONOCYTES ABSOLUTE: 0.7 K/CU MM
MONOCYTES ABSOLUTE: 0.8 K/CU MM
MONOCYTES ABSOLUTE: 0.8 K/CU MM
MONOCYTES ABSOLUTE: 0.9 K/CU MM
MONOCYTES RELATIVE PERCENT: 11.2 % (ref 0–4)
MONOCYTES RELATIVE PERCENT: 12.9 % (ref 0–4)
MONOCYTES RELATIVE PERCENT: 8.7 % (ref 0–4)
MONOCYTES RELATIVE PERCENT: 9.4 % (ref 0–4)
NUCLEATED RBC %: 0 %
PDW BLD-RTO: 14.5 % (ref 11.7–14.9)
PDW BLD-RTO: 14.6 % (ref 11.7–14.9)
PDW BLD-RTO: 14.8 % (ref 11.7–14.9)
PLATELET # BLD: 146 K/CU MM (ref 140–440)
PLATELET # BLD: 157 K/CU MM (ref 140–440)
PLATELET # BLD: 165 K/CU MM (ref 140–440)
PLATELET # BLD: 166 K/CU MM (ref 140–440)
PLATELET # BLD: 172 K/CU MM (ref 140–440)
PMV BLD AUTO: 10.7 FL (ref 7.5–11.1)
PMV BLD AUTO: 10.8 FL (ref 7.5–11.1)
PMV BLD AUTO: 10.8 FL (ref 7.5–11.1)
PMV BLD AUTO: 11 FL (ref 7.5–11.1)
PMV BLD AUTO: 11 FL (ref 7.5–11.1)
POTASSIUM SERPL-SCNC: 3.3 MMOL/L (ref 3.5–5.1)
POTASSIUM SERPL-SCNC: 3.6 MMOL/L (ref 3.5–5.1)
POTASSIUM SERPL-SCNC: 3.8 MMOL/L (ref 3.5–5.1)
POTASSIUM SERPL-SCNC: 3.8 MMOL/L (ref 3.5–5.1)
POTASSIUM SERPL-SCNC: 4.3 MMOL/L (ref 3.5–5.1)
POTASSIUM SERPL-SCNC: 4.3 MMOL/L (ref 3.5–5.1)
PRO-BNP: 612.8 PG/ML
PROTHROMBIN TIME: 13.9 SECONDS (ref 11.7–14.5)
RBC # BLD: 4.21 M/CU MM (ref 4.2–5.4)
RBC # BLD: 4.28 M/CU MM (ref 4.2–5.4)
RBC # BLD: 4.32 M/CU MM (ref 4.2–5.4)
RBC # BLD: 4.35 M/CU MM (ref 4.2–5.4)
RBC # BLD: 4.46 M/CU MM (ref 4.2–5.4)
REASON FOR REJECTION: NORMAL
REASON FOR REJECTION: NORMAL
REJECTED TEST: NORMAL
REJECTED TEST: NORMAL
SEGMENTED NEUTROPHILS ABSOLUTE COUNT: 4.6 K/CU MM
SEGMENTED NEUTROPHILS ABSOLUTE COUNT: 4.7 K/CU MM
SEGMENTED NEUTROPHILS ABSOLUTE COUNT: 5 K/CU MM
SEGMENTED NEUTROPHILS ABSOLUTE COUNT: 6.1 K/CU MM
SEGMENTED NEUTROPHILS RELATIVE PERCENT: 61.7 % (ref 36–66)
SEGMENTED NEUTROPHILS RELATIVE PERCENT: 62.8 % (ref 36–66)
SEGMENTED NEUTROPHILS RELATIVE PERCENT: 68 % (ref 36–66)
SEGMENTED NEUTROPHILS RELATIVE PERCENT: 70.1 % (ref 36–66)
SODIUM BLD-SCNC: 134 MMOL/L (ref 135–145)
SODIUM BLD-SCNC: 136 MMOL/L (ref 135–145)
SODIUM BLD-SCNC: 136 MMOL/L (ref 135–145)
SODIUM BLD-SCNC: 137 MMOL/L (ref 135–145)
SODIUM BLD-SCNC: 137 MMOL/L (ref 135–145)
SODIUM BLD-SCNC: 139 MMOL/L (ref 135–145)
T4 FREE: 1.75 NG/DL (ref 0.9–1.8)
TOTAL IMMATURE NEUTOROPHIL: 0.02 K/CU MM
TOTAL IMMATURE NEUTOROPHIL: 0.02 K/CU MM
TOTAL IMMATURE NEUTOROPHIL: 0.03 K/CU MM
TOTAL IMMATURE NEUTOROPHIL: 0.03 K/CU MM
TOTAL NUCLEATED RBC: 0 K/CU MM
TOTAL PROTEIN: 6.9 GM/DL (ref 6.4–8.2)
TROPONIN T: <0.01 NG/ML
TSH HIGH SENSITIVITY: 0.79 UIU/ML (ref 0.27–4.2)
WBC # BLD: 7.3 K/CU MM (ref 4–10.5)
WBC # BLD: 7.3 K/CU MM (ref 4–10.5)
WBC # BLD: 7.6 K/CU MM (ref 4–10.5)
WBC # BLD: 7.9 K/CU MM (ref 4–10.5)
WBC # BLD: 8.7 K/CU MM (ref 4–10.5)

## 2022-01-01 PROCEDURE — 2700000000 HC OXYGEN THERAPY PER DAY

## 2022-01-01 PROCEDURE — 97116 GAIT TRAINING THERAPY: CPT

## 2022-01-01 PROCEDURE — 6370000000 HC RX 637 (ALT 250 FOR IP): Performed by: STUDENT IN AN ORGANIZED HEALTH CARE EDUCATION/TRAINING PROGRAM

## 2022-01-01 PROCEDURE — 36415 COLL VENOUS BLD VENIPUNCTURE: CPT

## 2022-01-01 PROCEDURE — 1090F PRES/ABSN URINE INCON ASSESS: CPT | Performed by: NURSE PRACTITIONER

## 2022-01-01 PROCEDURE — 6370000000 HC RX 637 (ALT 250 FOR IP): Performed by: INTERNAL MEDICINE

## 2022-01-01 PROCEDURE — 82962 GLUCOSE BLOOD TEST: CPT

## 2022-01-01 PROCEDURE — 7100000000 HC PACU RECOVERY - FIRST 15 MIN

## 2022-01-01 PROCEDURE — 71045 X-RAY EXAM CHEST 1 VIEW: CPT

## 2022-01-01 PROCEDURE — 94761 N-INVAS EAR/PLS OXIMETRY MLT: CPT

## 2022-01-01 PROCEDURE — 6370000000 HC RX 637 (ALT 250 FOR IP): Performed by: NURSE PRACTITIONER

## 2022-01-01 PROCEDURE — 96376 TX/PRO/DX INJ SAME DRUG ADON: CPT

## 2022-01-01 PROCEDURE — 93010 ELECTROCARDIOGRAM REPORT: CPT | Performed by: INTERNAL MEDICINE

## 2022-01-01 PROCEDURE — 99232 SBSQ HOSP IP/OBS MODERATE 35: CPT | Performed by: INTERNAL MEDICINE

## 2022-01-01 PROCEDURE — 2500000003 HC RX 250 WO HCPCS: Performed by: NURSE PRACTITIONER

## 2022-01-01 PROCEDURE — 93306 TTE W/DOPPLER COMPLETE: CPT

## 2022-01-01 PROCEDURE — G8427 DOCREV CUR MEDS BY ELIG CLIN: HCPCS | Performed by: NURSE PRACTITIONER

## 2022-01-01 PROCEDURE — 80053 COMPREHEN METABOLIC PANEL: CPT

## 2022-01-01 PROCEDURE — APPSS45 APP SPLIT SHARED TIME 31-45 MINUTES: Performed by: NURSE PRACTITIONER

## 2022-01-01 PROCEDURE — 93005 ELECTROCARDIOGRAM TRACING: CPT | Performed by: NURSE PRACTITIONER

## 2022-01-01 PROCEDURE — 85025 COMPLETE CBC W/AUTO DIFF WBC: CPT

## 2022-01-01 PROCEDURE — 2580000003 HC RX 258: Performed by: NURSE PRACTITIONER

## 2022-01-01 PROCEDURE — 92950 HEART/LUNG RESUSCITATION CPR: CPT

## 2022-01-01 PROCEDURE — APPSS60 APP SPLIT SHARED TIME 46-60 MINUTES: Performed by: NURSE PRACTITIONER

## 2022-01-01 PROCEDURE — 99221 1ST HOSP IP/OBS SF/LOW 40: CPT | Performed by: INTERNAL MEDICINE

## 2022-01-01 PROCEDURE — 5A2204Z RESTORATION OF CARDIAC RHYTHM, SINGLE: ICD-10-PCS | Performed by: INTERNAL MEDICINE

## 2022-01-01 PROCEDURE — 2060000000 HC ICU INTERMEDIATE R&B

## 2022-01-01 PROCEDURE — 96375 TX/PRO/DX INJ NEW DRUG ADDON: CPT

## 2022-01-01 PROCEDURE — 93005 ELECTROCARDIOGRAM TRACING: CPT | Performed by: EMERGENCY MEDICINE

## 2022-01-01 PROCEDURE — G8417 CALC BMI ABV UP PARAM F/U: HCPCS | Performed by: NURSE PRACTITIONER

## 2022-01-01 PROCEDURE — 1123F ACP DISCUSS/DSCN MKR DOCD: CPT | Performed by: NURSE PRACTITIONER

## 2022-01-01 PROCEDURE — 99233 SBSQ HOSP IP/OBS HIGH 50: CPT | Performed by: INTERNAL MEDICINE

## 2022-01-01 PROCEDURE — 97530 THERAPEUTIC ACTIVITIES: CPT

## 2022-01-01 PROCEDURE — 94664 DEMO&/EVAL PT USE INHALER: CPT

## 2022-01-01 PROCEDURE — 80048 BASIC METABOLIC PNL TOTAL CA: CPT

## 2022-01-01 PROCEDURE — 94640 AIRWAY INHALATION TREATMENT: CPT

## 2022-01-01 PROCEDURE — 6360000002 HC RX W HCPCS: Performed by: STUDENT IN AN ORGANIZED HEALTH CARE EDUCATION/TRAINING PROGRAM

## 2022-01-01 PROCEDURE — 85730 THROMBOPLASTIN TIME PARTIAL: CPT

## 2022-01-01 PROCEDURE — 84439 ASSAY OF FREE THYROXINE: CPT

## 2022-01-01 PROCEDURE — 93312 ECHO TRANSESOPHAGEAL: CPT

## 2022-01-01 PROCEDURE — 1036F TOBACCO NON-USER: CPT | Performed by: NURSE PRACTITIONER

## 2022-01-01 PROCEDURE — 31500 INSERT EMERGENCY AIRWAY: CPT | Performed by: NURSE ANESTHETIST, CERTIFIED REGISTERED

## 2022-01-01 PROCEDURE — 2500000003 HC RX 250 WO HCPCS: Performed by: EMERGENCY MEDICINE

## 2022-01-01 PROCEDURE — 93000 ELECTROCARDIOGRAM COMPLETE: CPT | Performed by: NURSE PRACTITIONER

## 2022-01-01 PROCEDURE — 94660 CPAP INITIATION&MGMT: CPT

## 2022-01-01 PROCEDURE — 85027 COMPLETE CBC AUTOMATED: CPT

## 2022-01-01 PROCEDURE — 99223 1ST HOSP IP/OBS HIGH 75: CPT | Performed by: INTERNAL MEDICINE

## 2022-01-01 PROCEDURE — 92960 CARDIOVERSION ELECTRIC EXT: CPT

## 2022-01-01 PROCEDURE — G8484 FLU IMMUNIZE NO ADMIN: HCPCS | Performed by: NURSE PRACTITIONER

## 2022-01-01 PROCEDURE — 83735 ASSAY OF MAGNESIUM: CPT

## 2022-01-01 PROCEDURE — 96365 THER/PROPH/DIAG IV INF INIT: CPT

## 2022-01-01 PROCEDURE — 93005 ELECTROCARDIOGRAM TRACING: CPT | Performed by: INTERNAL MEDICINE

## 2022-01-01 PROCEDURE — 97535 SELF CARE MNGMENT TRAINING: CPT

## 2022-01-01 PROCEDURE — 83036 HEMOGLOBIN GLYCOSYLATED A1C: CPT

## 2022-01-01 PROCEDURE — 96366 THER/PROPH/DIAG IV INF ADDON: CPT

## 2022-01-01 PROCEDURE — 99215 OFFICE O/P EST HI 40 MIN: CPT | Performed by: NURSE PRACTITIONER

## 2022-01-01 PROCEDURE — 2500000003 HC RX 250 WO HCPCS

## 2022-01-01 PROCEDURE — 31500 INSERT EMERGENCY AIRWAY: CPT

## 2022-01-01 PROCEDURE — 96368 THER/DIAG CONCURRENT INF: CPT

## 2022-01-01 PROCEDURE — 84443 ASSAY THYROID STIM HORMONE: CPT

## 2022-01-01 PROCEDURE — 6360000002 HC RX W HCPCS: Performed by: INTERNAL MEDICINE

## 2022-01-01 PROCEDURE — 99285 EMERGENCY DEPT VISIT HI MDM: CPT

## 2022-01-01 PROCEDURE — 6360000002 HC RX W HCPCS: Performed by: EMERGENCY MEDICINE

## 2022-01-01 PROCEDURE — 6360000002 HC RX W HCPCS

## 2022-01-01 PROCEDURE — 97166 OT EVAL MOD COMPLEX 45 MIN: CPT

## 2022-01-01 PROCEDURE — 84484 ASSAY OF TROPONIN QUANT: CPT

## 2022-01-01 PROCEDURE — 7100000001 HC PACU RECOVERY - ADDTL 15 MIN

## 2022-01-01 PROCEDURE — 83880 ASSAY OF NATRIURETIC PEPTIDE: CPT

## 2022-01-01 PROCEDURE — 85610 PROTHROMBIN TIME: CPT

## 2022-01-01 PROCEDURE — APPNB60 APP NON BILLABLE TIME 46-60 MINS: Performed by: NURSE PRACTITIONER

## 2022-01-01 PROCEDURE — 89220 SPUTUM SPECIMEN COLLECTION: CPT

## 2022-01-01 PROCEDURE — 97162 PT EVAL MOD COMPLEX 30 MIN: CPT

## 2022-01-01 PROCEDURE — G8399 PT W/DXA RESULTS DOCUMENT: HCPCS | Performed by: NURSE PRACTITIONER

## 2022-01-01 PROCEDURE — 6360000002 HC RX W HCPCS: Performed by: NURSE PRACTITIONER

## 2022-01-01 PROCEDURE — 2500000003 HC RX 250 WO HCPCS: Performed by: INTERNAL MEDICINE

## 2022-01-01 RX ORDER — AMIODARONE HYDROCHLORIDE 50 MG/ML
INJECTION, SOLUTION INTRAVENOUS
Status: COMPLETED | OUTPATIENT
Start: 2022-01-01 | End: 2022-01-01

## 2022-01-01 RX ORDER — SODIUM CHLORIDE 0.9 % (FLUSH) 0.9 %
5-40 SYRINGE (ML) INJECTION PRN
Status: DISCONTINUED | OUTPATIENT
Start: 2022-01-01 | End: 2022-11-02 | Stop reason: HOSPADM

## 2022-01-01 RX ORDER — FUROSEMIDE 40 MG/1
40 TABLET ORAL DAILY
Status: ON HOLD | COMMUNITY
End: 2022-01-01 | Stop reason: HOSPADM

## 2022-01-01 RX ORDER — INSULIN GLARGINE 100 [IU]/ML
30 INJECTION, SOLUTION SUBCUTANEOUS DAILY
Status: DISCONTINUED | OUTPATIENT
Start: 2022-01-01 | End: 2022-11-02 | Stop reason: HOSPADM

## 2022-01-01 RX ORDER — AMIODARONE HYDROCHLORIDE 400 MG/1
400 TABLET ORAL 2 TIMES DAILY
Qty: 60 TABLET | Refills: 0 | Status: SHIPPED | OUTPATIENT
Start: 2022-01-01 | End: 2022-11-28

## 2022-01-01 RX ORDER — AMIODARONE HYDROCHLORIDE 200 MG/1
400 TABLET ORAL 2 TIMES DAILY
Status: DISCONTINUED | OUTPATIENT
Start: 2022-01-01 | End: 2022-01-01

## 2022-01-01 RX ORDER — ALBUTEROL SULFATE 90 UG/1
2 AEROSOL, METERED RESPIRATORY (INHALATION) EVERY 6 HOURS PRN
Status: DISCONTINUED | OUTPATIENT
Start: 2022-01-01 | End: 2022-11-02 | Stop reason: HOSPADM

## 2022-01-01 RX ORDER — SODIUM CHLORIDE 9 MG/ML
INJECTION, SOLUTION INTRAVENOUS PRN
Status: DISCONTINUED | OUTPATIENT
Start: 2022-01-01 | End: 2022-11-02 | Stop reason: HOSPADM

## 2022-01-01 RX ORDER — ROSUVASTATIN CALCIUM 20 MG/1
40 TABLET, COATED ORAL DAILY
Status: DISCONTINUED | OUTPATIENT
Start: 2022-01-01 | End: 2022-11-02 | Stop reason: HOSPADM

## 2022-01-01 RX ORDER — TORSEMIDE 20 MG/1
40 TABLET ORAL DAILY
Status: DISCONTINUED | OUTPATIENT
Start: 2022-01-01 | End: 2022-11-02 | Stop reason: HOSPADM

## 2022-01-01 RX ORDER — POTASSIUM CHLORIDE 20 MEQ/1
40 TABLET, EXTENDED RELEASE ORAL ONCE
Status: COMPLETED | OUTPATIENT
Start: 2022-01-01 | End: 2022-01-01

## 2022-01-01 RX ORDER — MAGNESIUM SULFATE 1 G/100ML
1000 INJECTION INTRAVENOUS ONCE
Status: COMPLETED | OUTPATIENT
Start: 2022-01-01 | End: 2022-01-01

## 2022-01-01 RX ORDER — EPINEPHRINE 0.1 MG/ML
SYRINGE (ML) INJECTION
Status: COMPLETED | OUTPATIENT
Start: 2022-01-01 | End: 2022-01-01

## 2022-01-01 RX ORDER — DILTIAZEM HYDROCHLORIDE 5 MG/ML
10 INJECTION INTRAVENOUS ONCE
Status: COMPLETED | OUTPATIENT
Start: 2022-01-01 | End: 2022-01-01

## 2022-01-01 RX ORDER — DIGOXIN 250 MCG
500 TABLET ORAL ONCE
Status: COMPLETED | OUTPATIENT
Start: 2022-01-01 | End: 2022-01-01

## 2022-01-01 RX ORDER — MAGNESIUM SULFATE HEPTAHYDRATE 500 MG/ML
INJECTION, SOLUTION INTRAMUSCULAR; INTRAVENOUS
Status: COMPLETED | OUTPATIENT
Start: 2022-01-01 | End: 2022-01-01

## 2022-01-01 RX ORDER — ONDANSETRON 4 MG/1
4 TABLET, ORALLY DISINTEGRATING ORAL EVERY 8 HOURS PRN
Status: DISCONTINUED | OUTPATIENT
Start: 2022-01-01 | End: 2022-11-02 | Stop reason: HOSPADM

## 2022-01-01 RX ORDER — DEXTROSE MONOHYDRATE 100 MG/ML
INJECTION, SOLUTION INTRAVENOUS CONTINUOUS PRN
Status: DISCONTINUED | OUTPATIENT
Start: 2022-01-01 | End: 2022-11-02 | Stop reason: HOSPADM

## 2022-01-01 RX ORDER — LEVOTHYROXINE SODIUM 0.1 MG/1
200 TABLET ORAL DAILY
Status: DISCONTINUED | OUTPATIENT
Start: 2022-01-01 | End: 2022-11-02 | Stop reason: HOSPADM

## 2022-01-01 RX ORDER — FUROSEMIDE 10 MG/ML
40 INJECTION INTRAMUSCULAR; INTRAVENOUS 2 TIMES DAILY
Status: DISCONTINUED | OUTPATIENT
Start: 2022-01-01 | End: 2022-01-01

## 2022-01-01 RX ORDER — ONDANSETRON 2 MG/ML
4 INJECTION INTRAMUSCULAR; INTRAVENOUS EVERY 6 HOURS PRN
Status: DISCONTINUED | OUTPATIENT
Start: 2022-01-01 | End: 2022-11-02 | Stop reason: HOSPADM

## 2022-01-01 RX ORDER — LANOLIN ALCOHOL/MO/W.PET/CERES
400 CREAM (GRAM) TOPICAL DAILY
Qty: 30 TABLET | Refills: 2 | Status: SHIPPED | OUTPATIENT
Start: 2022-01-01 | End: 2022-11-29

## 2022-01-01 RX ORDER — METOPROLOL SUCCINATE 25 MG/1
25 TABLET, EXTENDED RELEASE ORAL DAILY
Status: DISCONTINUED | OUTPATIENT
Start: 2022-11-02 | End: 2022-11-02 | Stop reason: HOSPADM

## 2022-01-01 RX ORDER — METOPROLOL SUCCINATE 50 MG/1
50 TABLET, EXTENDED RELEASE ORAL DAILY
Status: DISCONTINUED | OUTPATIENT
Start: 2022-01-01 | End: 2022-01-01

## 2022-01-01 RX ORDER — DIGOXIN 125 MCG
125 TABLET ORAL DAILY
Qty: 30 TABLET | Refills: 3 | Status: SHIPPED | OUTPATIENT
Start: 2022-01-01

## 2022-01-01 RX ORDER — HEPARIN SODIUM 1000 [USP'U]/ML
4000 INJECTION, SOLUTION INTRAVENOUS; SUBCUTANEOUS ONCE
Status: COMPLETED | OUTPATIENT
Start: 2022-01-01 | End: 2022-01-01

## 2022-01-01 RX ORDER — INSULIN LISPRO 100 [IU]/ML
0-8 INJECTION, SOLUTION INTRAVENOUS; SUBCUTANEOUS
Status: DISCONTINUED | OUTPATIENT
Start: 2022-01-01 | End: 2022-11-02 | Stop reason: HOSPADM

## 2022-01-01 RX ORDER — GLIPIZIDE 5 MG/1
10 TABLET ORAL
Status: DISCONTINUED | OUTPATIENT
Start: 2022-01-01 | End: 2022-11-02 | Stop reason: HOSPADM

## 2022-01-01 RX ORDER — LANOLIN ALCOHOL/MO/W.PET/CERES
400 CREAM (GRAM) TOPICAL DAILY
Status: DISCONTINUED | OUTPATIENT
Start: 2022-01-01 | End: 2022-11-02 | Stop reason: HOSPADM

## 2022-01-01 RX ORDER — INSULIN LISPRO 100 [IU]/ML
2 INJECTION, SOLUTION INTRAVENOUS; SUBCUTANEOUS ONCE
Status: COMPLETED | OUTPATIENT
Start: 2022-01-01 | End: 2022-01-01

## 2022-01-01 RX ORDER — FUROSEMIDE 40 MG/1
40 TABLET ORAL DAILY
Status: DISCONTINUED | OUTPATIENT
Start: 2022-01-01 | End: 2022-01-01

## 2022-01-01 RX ORDER — CILOSTAZOL 100 MG/1
50 TABLET ORAL 2 TIMES DAILY
Status: DISCONTINUED | OUTPATIENT
Start: 2022-01-01 | End: 2022-11-02 | Stop reason: HOSPADM

## 2022-01-01 RX ORDER — SODIUM CHLORIDE 0.9 % (FLUSH) 0.9 %
5-40 SYRINGE (ML) INJECTION EVERY 12 HOURS SCHEDULED
Status: DISCONTINUED | OUTPATIENT
Start: 2022-01-01 | End: 2022-11-02 | Stop reason: HOSPADM

## 2022-01-01 RX ORDER — FLUMAZENIL 0.1 MG/ML
0.2 INJECTION INTRAVENOUS ONCE
Status: COMPLETED | OUTPATIENT
Start: 2022-01-01 | End: 2022-01-01

## 2022-01-01 RX ORDER — INSULIN LISPRO 100 [IU]/ML
0-4 INJECTION, SOLUTION INTRAVENOUS; SUBCUTANEOUS NIGHTLY
Status: DISCONTINUED | OUTPATIENT
Start: 2022-01-01 | End: 2022-01-01

## 2022-01-01 RX ORDER — BUDESONIDE AND FORMOTEROL FUMARATE DIHYDRATE 80; 4.5 UG/1; UG/1
2 AEROSOL RESPIRATORY (INHALATION) 2 TIMES DAILY
Status: DISCONTINUED | OUTPATIENT
Start: 2022-01-01 | End: 2022-11-02 | Stop reason: HOSPADM

## 2022-01-01 RX ORDER — INSULIN LISPRO 100 [IU]/ML
0-4 INJECTION, SOLUTION INTRAVENOUS; SUBCUTANEOUS
Status: DISCONTINUED | OUTPATIENT
Start: 2022-01-01 | End: 2022-01-01

## 2022-01-01 RX ORDER — DILTIAZEM HYDROCHLORIDE 60 MG/1
60 TABLET, FILM COATED ORAL EVERY 6 HOURS SCHEDULED
Status: DISCONTINUED | OUTPATIENT
Start: 2022-01-01 | End: 2022-01-01

## 2022-01-01 RX ORDER — AMIODARONE HYDROCHLORIDE 200 MG/1
200 TABLET ORAL 2 TIMES DAILY
Status: DISCONTINUED | OUTPATIENT
Start: 2022-11-03 | End: 2022-11-02 | Stop reason: HOSPADM

## 2022-01-01 RX ORDER — DIGOXIN 250 MCG
250 TABLET ORAL ONCE
Status: COMPLETED | OUTPATIENT
Start: 2022-01-01 | End: 2022-01-01

## 2022-01-01 RX ORDER — ACETAMINOPHEN 650 MG/1
650 SUPPOSITORY RECTAL EVERY 6 HOURS PRN
Status: DISCONTINUED | OUTPATIENT
Start: 2022-01-01 | End: 2022-11-02 | Stop reason: HOSPADM

## 2022-01-01 RX ORDER — POTASSIUM CHLORIDE 20 MEQ/1
40 TABLET, EXTENDED RELEASE ORAL 2 TIMES DAILY WITH MEALS
Status: DISCONTINUED | OUTPATIENT
Start: 2022-01-01 | End: 2022-01-01

## 2022-01-01 RX ORDER — MAGNESIUM SULFATE IN WATER 40 MG/ML
2000 INJECTION, SOLUTION INTRAVENOUS ONCE
Status: COMPLETED | OUTPATIENT
Start: 2022-01-01 | End: 2022-01-01

## 2022-01-01 RX ORDER — DEXTROSE MONOHYDRATE 25 G/50ML
INJECTION, SOLUTION INTRAVENOUS
Status: COMPLETED | OUTPATIENT
Start: 2022-01-01 | End: 2022-01-01

## 2022-01-01 RX ORDER — LISINOPRIL 5 MG/1
5 TABLET ORAL DAILY
Qty: 30 TABLET | Refills: 1 | Status: CANCELLED | OUTPATIENT
Start: 2022-01-01

## 2022-01-01 RX ORDER — LISINOPRIL 5 MG/1
10 TABLET ORAL DAILY
Status: DISCONTINUED | OUTPATIENT
Start: 2022-01-01 | End: 2022-01-01

## 2022-01-01 RX ORDER — DEXTROSE MONOHYDRATE 25 G/50ML
INJECTION, SOLUTION INTRAVENOUS
Status: DISCONTINUED
Start: 2022-01-01 | End: 2022-11-02 | Stop reason: HOSPADM

## 2022-01-01 RX ORDER — AMIODARONE HYDROCHLORIDE 200 MG/1
200 TABLET ORAL 2 TIMES DAILY
Status: DISCONTINUED | OUTPATIENT
Start: 2022-01-01 | End: 2022-11-02 | Stop reason: HOSPADM

## 2022-01-01 RX ORDER — HEPARIN SODIUM 1000 [USP'U]/ML
2000 INJECTION, SOLUTION INTRAVENOUS; SUBCUTANEOUS PRN
Status: DISCONTINUED | OUTPATIENT
Start: 2022-01-01 | End: 2022-01-01

## 2022-01-01 RX ORDER — DIGOXIN 0.25 MG/ML
500 INJECTION INTRAMUSCULAR; INTRAVENOUS ONCE
Status: DISCONTINUED | OUTPATIENT
Start: 2022-01-01 | End: 2022-01-01

## 2022-01-01 RX ORDER — FUROSEMIDE 10 MG/ML
40 INJECTION INTRAMUSCULAR; INTRAVENOUS ONCE
Status: COMPLETED | OUTPATIENT
Start: 2022-01-01 | End: 2022-01-01

## 2022-01-01 RX ORDER — METOPROLOL SUCCINATE 50 MG/1
50 TABLET, EXTENDED RELEASE ORAL DAILY
Qty: 30 TABLET | Refills: 3 | Status: SHIPPED | OUTPATIENT
Start: 2022-01-01

## 2022-01-01 RX ORDER — HEPARIN SODIUM 10000 [USP'U]/100ML
5-30 INJECTION, SOLUTION INTRAVENOUS CONTINUOUS
Status: DISCONTINUED | OUTPATIENT
Start: 2022-01-01 | End: 2022-01-01

## 2022-01-01 RX ORDER — HEPARIN SODIUM 1000 [USP'U]/ML
4000 INJECTION, SOLUTION INTRAVENOUS; SUBCUTANEOUS PRN
Status: DISCONTINUED | OUTPATIENT
Start: 2022-01-01 | End: 2022-01-01

## 2022-01-01 RX ORDER — POLYETHYLENE GLYCOL 3350 17 G/17G
17 POWDER, FOR SOLUTION ORAL DAILY PRN
Status: DISCONTINUED | OUTPATIENT
Start: 2022-01-01 | End: 2022-11-02 | Stop reason: HOSPADM

## 2022-01-01 RX ORDER — ACETAMINOPHEN 325 MG/1
650 TABLET ORAL EVERY 6 HOURS PRN
Status: DISCONTINUED | OUTPATIENT
Start: 2022-01-01 | End: 2022-11-02 | Stop reason: HOSPADM

## 2022-01-01 RX ORDER — DIGOXIN 125 MCG
125 TABLET ORAL DAILY
Status: DISCONTINUED | OUTPATIENT
Start: 2022-01-01 | End: 2022-01-01

## 2022-01-01 RX ORDER — GLIPIZIDE 10 MG/1
10 TABLET ORAL
Qty: 60 TABLET | Refills: 3 | Status: SHIPPED | OUTPATIENT
Start: 2022-01-01

## 2022-01-01 RX ADMIN — APIXABAN 5 MG: 5 TABLET, FILM COATED ORAL at 20:58

## 2022-01-01 RX ADMIN — DILTIAZEM HYDROCHLORIDE 60 MG: 60 TABLET, FILM COATED ORAL at 12:12

## 2022-01-01 RX ADMIN — SODIUM CHLORIDE, PRESERVATIVE FREE 10 ML: 5 INJECTION INTRAVENOUS at 21:02

## 2022-01-01 RX ADMIN — MAGNESIUM SULFATE IN DEXTROSE 1000 MG: 10 INJECTION, SOLUTION INTRAVENOUS at 18:47

## 2022-01-01 RX ADMIN — APIXABAN 5 MG: 5 TABLET, FILM COATED ORAL at 09:16

## 2022-01-01 RX ADMIN — DILTIAZEM HYDROCHLORIDE 10 MG: 5 INJECTION INTRAVENOUS at 15:52

## 2022-01-01 RX ADMIN — Medication 400 MG: at 09:44

## 2022-01-01 RX ADMIN — APIXABAN 5 MG: 5 TABLET, FILM COATED ORAL at 20:11

## 2022-01-01 RX ADMIN — AMIODARONE HYDROCHLORIDE 400 MG: 200 TABLET ORAL at 20:23

## 2022-01-01 RX ADMIN — AMIODARONE HYDROCHLORIDE 400 MG: 200 TABLET ORAL at 12:40

## 2022-01-01 RX ADMIN — METOPROLOL SUCCINATE 50 MG: 50 TABLET, EXTENDED RELEASE ORAL at 11:26

## 2022-01-01 RX ADMIN — SODIUM CHLORIDE, PRESERVATIVE FREE 10 ML: 5 INJECTION INTRAVENOUS at 09:45

## 2022-01-01 RX ADMIN — GLIPIZIDE 10 MG: 5 TABLET ORAL at 06:16

## 2022-01-01 RX ADMIN — AMIODARONE HYDROCHLORIDE 300 MG: 50 INJECTION, SOLUTION INTRAVENOUS at 22:18

## 2022-01-01 RX ADMIN — AMIODARONE HYDROCHLORIDE 400 MG: 200 TABLET ORAL at 09:44

## 2022-01-01 RX ADMIN — ROSUVASTATIN CALCIUM 40 MG: 20 TABLET, FILM COATED ORAL at 08:06

## 2022-01-01 RX ADMIN — GLIPIZIDE 10 MG: 5 TABLET ORAL at 06:45

## 2022-01-01 RX ADMIN — APIXABAN 5 MG: 5 TABLET, FILM COATED ORAL at 09:00

## 2022-01-01 RX ADMIN — DILTIAZEM HYDROCHLORIDE 60 MG: 60 TABLET, FILM COATED ORAL at 06:14

## 2022-01-01 RX ADMIN — LEVOTHYROXINE SODIUM 200 MCG: 0.1 TABLET ORAL at 06:10

## 2022-01-01 RX ADMIN — ROSUVASTATIN CALCIUM 40 MG: 20 TABLET, FILM COATED ORAL at 09:43

## 2022-01-01 RX ADMIN — LEVOTHYROXINE SODIUM 200 MCG: 0.1 TABLET ORAL at 06:44

## 2022-01-01 RX ADMIN — SODIUM CHLORIDE, PRESERVATIVE FREE 10 ML: 5 INJECTION INTRAVENOUS at 21:00

## 2022-01-01 RX ADMIN — DIGOXIN 125 MCG: 125 TABLET ORAL at 08:07

## 2022-01-01 RX ADMIN — EPINEPHRINE 1 MG: 0.1 INJECTION INTRACARDIAC; INTRAVENOUS at 22:14

## 2022-01-01 RX ADMIN — APIXABAN 5 MG: 5 TABLET, FILM COATED ORAL at 20:28

## 2022-01-01 RX ADMIN — INSULIN LISPRO 2 UNITS: 100 INJECTION, SOLUTION INTRAVENOUS; SUBCUTANEOUS at 12:36

## 2022-01-01 RX ADMIN — LEVOTHYROXINE SODIUM 200 MCG: 0.1 TABLET ORAL at 06:01

## 2022-01-01 RX ADMIN — GLIPIZIDE 10 MG: 5 TABLET ORAL at 17:07

## 2022-01-01 RX ADMIN — TORSEMIDE 40 MG: 20 TABLET ORAL at 09:43

## 2022-01-01 RX ADMIN — BUDESONIDE AND FORMOTEROL FUMARATE DIHYDRATE 2 PUFF: 80; 4.5 AEROSOL RESPIRATORY (INHALATION) at 21:21

## 2022-01-01 RX ADMIN — METOPROLOL SUCCINATE 50 MG: 50 TABLET, EXTENDED RELEASE ORAL at 09:44

## 2022-01-01 RX ADMIN — Medication 400 MG: at 11:26

## 2022-01-01 RX ADMIN — DILTIAZEM HYDROCHLORIDE 30 MG: 30 TABLET, FILM COATED ORAL at 05:33

## 2022-01-01 RX ADMIN — APIXABAN 5 MG: 5 TABLET, FILM COATED ORAL at 21:21

## 2022-01-01 RX ADMIN — INSULIN GLARGINE 30 UNITS: 100 INJECTION, SOLUTION SUBCUTANEOUS at 11:34

## 2022-01-01 RX ADMIN — SODIUM CHLORIDE, PRESERVATIVE FREE 10 ML: 5 INJECTION INTRAVENOUS at 09:02

## 2022-01-01 RX ADMIN — LEVOTHYROXINE SODIUM 200 MCG: 0.1 TABLET ORAL at 06:17

## 2022-01-01 RX ADMIN — CILOSTAZOL 50 MG: 100 TABLET ORAL at 20:11

## 2022-01-01 RX ADMIN — DILTIAZEM HYDROCHLORIDE 60 MG: 60 TABLET, FILM COATED ORAL at 12:20

## 2022-01-01 RX ADMIN — LEVOTHYROXINE SODIUM 200 MCG: 0.1 TABLET ORAL at 06:16

## 2022-01-01 RX ADMIN — CILOSTAZOL 50 MG: 100 TABLET ORAL at 08:06

## 2022-01-01 RX ADMIN — SODIUM CHLORIDE, PRESERVATIVE FREE 10 ML: 5 INJECTION INTRAVENOUS at 21:29

## 2022-01-01 RX ADMIN — FUROSEMIDE 40 MG: 10 INJECTION, SOLUTION INTRAMUSCULAR; INTRAVENOUS at 08:28

## 2022-01-01 RX ADMIN — ROSUVASTATIN CALCIUM 40 MG: 20 TABLET, FILM COATED ORAL at 11:26

## 2022-01-01 RX ADMIN — FUROSEMIDE 40 MG: 40 TABLET ORAL at 08:05

## 2022-01-01 RX ADMIN — ROSUVASTATIN CALCIUM 40 MG: 20 TABLET, FILM COATED ORAL at 11:17

## 2022-01-01 RX ADMIN — ROSUVASTATIN CALCIUM 40 MG: 20 TABLET, FILM COATED ORAL at 08:29

## 2022-01-01 RX ADMIN — DIGOXIN 250 MCG: 250 TABLET ORAL at 17:32

## 2022-01-01 RX ADMIN — CILOSTAZOL 50 MG: 100 TABLET ORAL at 09:16

## 2022-01-01 RX ADMIN — DILTIAZEM HYDROCHLORIDE 60 MG: 60 TABLET, FILM COATED ORAL at 06:12

## 2022-01-01 RX ADMIN — HEPARIN SODIUM 14 UNITS/KG/HR: 10000 INJECTION, SOLUTION INTRAVENOUS at 09:22

## 2022-01-01 RX ADMIN — FUROSEMIDE 40 MG: 10 INJECTION, SOLUTION INTRAVENOUS at 15:58

## 2022-01-01 RX ADMIN — INSULIN LISPRO 2 UNITS: 100 INJECTION, SOLUTION INTRAVENOUS; SUBCUTANEOUS at 11:33

## 2022-01-01 RX ADMIN — BUDESONIDE AND FORMOTEROL FUMARATE DIHYDRATE 2 PUFF: 80; 4.5 AEROSOL RESPIRATORY (INHALATION) at 20:37

## 2022-01-01 RX ADMIN — INSULIN GLARGINE 30 UNITS: 100 INJECTION, SOLUTION SUBCUTANEOUS at 12:04

## 2022-01-01 RX ADMIN — SODIUM CHLORIDE, PRESERVATIVE FREE 10 ML: 5 INJECTION INTRAVENOUS at 08:07

## 2022-01-01 RX ADMIN — AMIODARONE HYDROCHLORIDE 400 MG: 200 TABLET ORAL at 20:12

## 2022-01-01 RX ADMIN — BUDESONIDE AND FORMOTEROL FUMARATE DIHYDRATE 2 PUFF: 80; 4.5 AEROSOL RESPIRATORY (INHALATION) at 07:19

## 2022-01-01 RX ADMIN — APIXABAN 5 MG: 5 TABLET, FILM COATED ORAL at 11:19

## 2022-01-01 RX ADMIN — BUDESONIDE AND FORMOTEROL FUMARATE DIHYDRATE 2 PUFF: 80; 4.5 AEROSOL RESPIRATORY (INHALATION) at 07:58

## 2022-01-01 RX ADMIN — EPINEPHRINE 1 MG: 0.1 INJECTION INTRACARDIAC; INTRAVENOUS at 22:38

## 2022-01-01 RX ADMIN — TORSEMIDE 40 MG: 20 TABLET ORAL at 11:28

## 2022-01-01 RX ADMIN — FUROSEMIDE 40 MG: 10 INJECTION, SOLUTION INTRAMUSCULAR; INTRAVENOUS at 09:17

## 2022-01-01 RX ADMIN — SODIUM CHLORIDE, PRESERVATIVE FREE 10 ML: 5 INJECTION INTRAVENOUS at 20:24

## 2022-01-01 RX ADMIN — CILOSTAZOL 50 MG: 100 TABLET ORAL at 09:44

## 2022-01-01 RX ADMIN — LISINOPRIL 10 MG: 5 TABLET ORAL at 08:28

## 2022-01-01 RX ADMIN — TORSEMIDE 40 MG: 20 TABLET ORAL at 09:49

## 2022-01-01 RX ADMIN — CILOSTAZOL 50 MG: 100 TABLET ORAL at 20:51

## 2022-01-01 RX ADMIN — DILTIAZEM HYDROCHLORIDE 60 MG: 60 TABLET, FILM COATED ORAL at 17:22

## 2022-01-01 RX ADMIN — GLIPIZIDE 10 MG: 5 TABLET ORAL at 17:05

## 2022-01-01 RX ADMIN — DILTIAZEM HYDROCHLORIDE 60 MG: 60 TABLET, FILM COATED ORAL at 17:32

## 2022-01-01 RX ADMIN — SODIUM CHLORIDE, PRESERVATIVE FREE 10 ML: 5 INJECTION INTRAVENOUS at 22:23

## 2022-01-01 RX ADMIN — CILOSTAZOL 50 MG: 100 TABLET ORAL at 08:58

## 2022-01-01 RX ADMIN — CILOSTAZOL 50 MG: 100 TABLET ORAL at 08:28

## 2022-01-01 RX ADMIN — DIGOXIN 125 MCG: 125 TABLET ORAL at 09:43

## 2022-01-01 RX ADMIN — BUDESONIDE AND FORMOTEROL FUMARATE DIHYDRATE 2 PUFF: 80; 4.5 AEROSOL RESPIRATORY (INHALATION) at 21:02

## 2022-01-01 RX ADMIN — CILOSTAZOL 50 MG: 100 TABLET ORAL at 21:21

## 2022-01-01 RX ADMIN — LEVOTHYROXINE SODIUM 200 MCG: 0.1 TABLET ORAL at 06:45

## 2022-01-01 RX ADMIN — HEPARIN SODIUM 4000 UNITS: 1000 INJECTION INTRAVENOUS; SUBCUTANEOUS at 17:04

## 2022-01-01 RX ADMIN — DEXTROSE MONOHYDRATE 5 MG/HR: 50 INJECTION, SOLUTION INTRAVENOUS at 16:02

## 2022-01-01 RX ADMIN — DEXTROSE MONOHYDRATE 25 G: 25 INJECTION, SOLUTION INTRAVENOUS at 22:39

## 2022-01-01 RX ADMIN — CILOSTAZOL 50 MG: 100 TABLET ORAL at 11:21

## 2022-01-01 RX ADMIN — DEXTROSE MONOHYDRATE 15 MG/HR: 50 INJECTION, SOLUTION INTRAVENOUS at 00:27

## 2022-01-01 RX ADMIN — DIGOXIN 125 MCG: 125 TABLET ORAL at 11:18

## 2022-01-01 RX ADMIN — INSULIN LISPRO 2 UNITS: 100 INJECTION, SOLUTION INTRAVENOUS; SUBCUTANEOUS at 12:27

## 2022-01-01 RX ADMIN — FUROSEMIDE 40 MG: 40 TABLET ORAL at 12:12

## 2022-01-01 RX ADMIN — MAGNESIUM SULFATE HEPTAHYDRATE 2000 MG: 2 INJECTION, SOLUTION INTRAVENOUS at 10:05

## 2022-01-01 RX ADMIN — DILTIAZEM HYDROCHLORIDE 60 MG: 60 TABLET, FILM COATED ORAL at 00:11

## 2022-01-01 RX ADMIN — BUDESONIDE AND FORMOTEROL FUMARATE DIHYDRATE 2 PUFF: 80; 4.5 AEROSOL RESPIRATORY (INHALATION) at 07:47

## 2022-01-01 RX ADMIN — BUDESONIDE AND FORMOTEROL FUMARATE DIHYDRATE 2 PUFF: 80; 4.5 AEROSOL RESPIRATORY (INHALATION) at 07:55

## 2022-01-01 RX ADMIN — ROSUVASTATIN CALCIUM 40 MG: 20 TABLET, FILM COATED ORAL at 09:16

## 2022-01-01 RX ADMIN — CILOSTAZOL 50 MG: 100 TABLET ORAL at 23:22

## 2022-01-01 RX ADMIN — MAGNESIUM SULFATE HEPTAHYDRATE 2000 MG: 500 INJECTION, SOLUTION INTRAMUSCULAR; INTRAVENOUS at 22:16

## 2022-01-01 RX ADMIN — INSULIN GLARGINE 30 UNITS: 100 INJECTION, SOLUTION SUBCUTANEOUS at 09:30

## 2022-01-01 RX ADMIN — DIGOXIN 125 MCG: 125 TABLET ORAL at 11:26

## 2022-01-01 RX ADMIN — Medication 400 MG: at 08:07

## 2022-01-01 RX ADMIN — GLIPIZIDE 10 MG: 5 TABLET ORAL at 06:14

## 2022-01-01 RX ADMIN — GLIPIZIDE 10 MG: 5 TABLET ORAL at 16:04

## 2022-01-01 RX ADMIN — BUDESONIDE AND FORMOTEROL FUMARATE DIHYDRATE 2 PUFF: 80; 4.5 AEROSOL RESPIRATORY (INHALATION) at 20:01

## 2022-01-01 RX ADMIN — AMIODARONE HYDROCHLORIDE 400 MG: 200 TABLET ORAL at 20:58

## 2022-01-01 RX ADMIN — ROSUVASTATIN CALCIUM 40 MG: 20 TABLET, FILM COATED ORAL at 08:59

## 2022-01-01 RX ADMIN — APIXABAN 5 MG: 5 TABLET, FILM COATED ORAL at 20:51

## 2022-01-01 RX ADMIN — BUDESONIDE AND FORMOTEROL FUMARATE DIHYDRATE 2 PUFF: 80; 4.5 AEROSOL RESPIRATORY (INHALATION) at 09:04

## 2022-01-01 RX ADMIN — TORSEMIDE 40 MG: 20 TABLET ORAL at 09:25

## 2022-01-01 RX ADMIN — BUDESONIDE AND FORMOTEROL FUMARATE DIHYDRATE 2 PUFF: 80; 4.5 AEROSOL RESPIRATORY (INHALATION) at 19:58

## 2022-01-01 RX ADMIN — POTASSIUM CHLORIDE 40 MEQ: 1500 TABLET, EXTENDED RELEASE ORAL at 11:05

## 2022-01-01 RX ADMIN — Medication 50 MEQ: at 22:30

## 2022-01-01 RX ADMIN — DILTIAZEM HYDROCHLORIDE 60 MG: 60 TABLET, FILM COATED ORAL at 22:29

## 2022-01-01 RX ADMIN — Medication 50 MEQ: at 22:41

## 2022-01-01 RX ADMIN — EPINEPHRINE 1 MG: 0.1 INJECTION INTRACARDIAC; INTRAVENOUS at 22:29

## 2022-01-01 RX ADMIN — CILOSTAZOL 50 MG: 100 TABLET ORAL at 21:27

## 2022-01-01 RX ADMIN — AMIODARONE HYDROCHLORIDE 400 MG: 200 TABLET ORAL at 21:21

## 2022-01-01 RX ADMIN — HEPARIN SODIUM 12 UNITS/KG/HR: 10000 INJECTION, SOLUTION INTRAVENOUS at 17:14

## 2022-01-01 RX ADMIN — ROSUVASTATIN CALCIUM 40 MG: 20 TABLET, FILM COATED ORAL at 09:48

## 2022-01-01 RX ADMIN — BUDESONIDE AND FORMOTEROL FUMARATE DIHYDRATE 2 PUFF: 80; 4.5 AEROSOL RESPIRATORY (INHALATION) at 20:02

## 2022-01-01 RX ADMIN — SODIUM CHLORIDE, PRESERVATIVE FREE 10 ML: 5 INJECTION INTRAVENOUS at 20:12

## 2022-01-01 RX ADMIN — APIXABAN 5 MG: 5 TABLET, FILM COATED ORAL at 20:23

## 2022-01-01 RX ADMIN — LEVOTHYROXINE SODIUM 200 MCG: 0.1 TABLET ORAL at 06:12

## 2022-01-01 RX ADMIN — Medication 400 MG: at 08:59

## 2022-01-01 RX ADMIN — APIXABAN 5 MG: 5 TABLET, FILM COATED ORAL at 09:49

## 2022-01-01 RX ADMIN — INSULIN LISPRO 2 UNITS: 100 INJECTION, SOLUTION INTRAVENOUS; SUBCUTANEOUS at 22:25

## 2022-01-01 RX ADMIN — APIXABAN 5 MG: 5 TABLET, FILM COATED ORAL at 09:44

## 2022-01-01 RX ADMIN — TORSEMIDE 40 MG: 20 TABLET ORAL at 11:17

## 2022-01-01 RX ADMIN — GLIPIZIDE 10 MG: 5 TABLET ORAL at 18:13

## 2022-01-01 RX ADMIN — LEVOTHYROXINE SODIUM 200 MCG: 0.1 TABLET ORAL at 06:14

## 2022-01-01 RX ADMIN — Medication 400 MG: at 11:19

## 2022-01-01 RX ADMIN — Medication 400 MG: at 09:49

## 2022-01-01 RX ADMIN — INSULIN LISPRO 8 UNITS: 100 INJECTION, SOLUTION INTRAVENOUS; SUBCUTANEOUS at 22:05

## 2022-01-01 RX ADMIN — DILTIAZEM HYDROCHLORIDE 60 MG: 60 TABLET, FILM COATED ORAL at 06:02

## 2022-01-01 RX ADMIN — MAGNESIUM SULFATE HEPTAHYDRATE 2000 MG: 2 INJECTION, SOLUTION INTRAVENOUS at 10:20

## 2022-01-01 RX ADMIN — AMIODARONE HYDROCHLORIDE 400 MG: 200 TABLET ORAL at 09:49

## 2022-01-01 RX ADMIN — Medication 50 MEQ: at 22:24

## 2022-01-01 RX ADMIN — APIXABAN 5 MG: 5 TABLET, FILM COATED ORAL at 12:20

## 2022-01-01 RX ADMIN — INSULIN LISPRO 2 UNITS: 100 INJECTION, SOLUTION INTRAVENOUS; SUBCUTANEOUS at 16:05

## 2022-01-01 RX ADMIN — EPINEPHRINE 1 MG: 0.1 INJECTION INTRACARDIAC; INTRAVENOUS at 22:26

## 2022-01-01 RX ADMIN — AMIODARONE HYDROCHLORIDE 400 MG: 200 TABLET ORAL at 11:27

## 2022-01-01 RX ADMIN — BUDESONIDE AND FORMOTEROL FUMARATE DIHYDRATE 2 PUFF: 80; 4.5 AEROSOL RESPIRATORY (INHALATION) at 07:21

## 2022-01-01 RX ADMIN — BUDESONIDE AND FORMOTEROL FUMARATE DIHYDRATE 2 PUFF: 80; 4.5 AEROSOL RESPIRATORY (INHALATION) at 08:04

## 2022-01-01 RX ADMIN — SODIUM CHLORIDE, PRESERVATIVE FREE 10 ML: 5 INJECTION INTRAVENOUS at 07:51

## 2022-01-01 RX ADMIN — APIXABAN 5 MG: 5 TABLET, FILM COATED ORAL at 11:26

## 2022-01-01 RX ADMIN — AMIODARONE HYDROCHLORIDE 400 MG: 200 TABLET ORAL at 11:19

## 2022-01-01 RX ADMIN — SODIUM CHLORIDE, PRESERVATIVE FREE 10 ML: 5 INJECTION INTRAVENOUS at 11:37

## 2022-01-01 RX ADMIN — SODIUM CHLORIDE, PRESERVATIVE FREE 10 ML: 5 INJECTION INTRAVENOUS at 21:20

## 2022-01-01 RX ADMIN — METOPROLOL SUCCINATE 50 MG: 50 TABLET, EXTENDED RELEASE ORAL at 12:37

## 2022-01-01 RX ADMIN — GLIPIZIDE 10 MG: 5 TABLET ORAL at 17:32

## 2022-01-01 RX ADMIN — METOPROLOL SUCCINATE 25 MG: 50 TABLET, EXTENDED RELEASE ORAL at 12:01

## 2022-01-01 RX ADMIN — AMIODARONE HYDROCHLORIDE 150 MG: 50 INJECTION, SOLUTION INTRAVENOUS at 22:31

## 2022-01-01 RX ADMIN — SODIUM CHLORIDE, PRESERVATIVE FREE 10 ML: 5 INJECTION INTRAVENOUS at 20:27

## 2022-01-01 RX ADMIN — DIGOXIN 125 MCG: 125 TABLET ORAL at 09:48

## 2022-01-01 RX ADMIN — CILOSTAZOL 50 MG: 100 TABLET ORAL at 20:10

## 2022-01-01 RX ADMIN — BUDESONIDE AND FORMOTEROL FUMARATE DIHYDRATE 2 PUFF: 80; 4.5 AEROSOL RESPIRATORY (INHALATION) at 07:20

## 2022-01-01 RX ADMIN — EPINEPHRINE 1 MG: 0.1 INJECTION INTRACARDIAC; INTRAVENOUS at 22:23

## 2022-01-01 RX ADMIN — GLIPIZIDE 10 MG: 5 TABLET ORAL at 16:29

## 2022-01-01 RX ADMIN — CILOSTAZOL 50 MG: 100 TABLET ORAL at 20:58

## 2022-01-01 RX ADMIN — DILTIAZEM HYDROCHLORIDE 60 MG: 60 TABLET, FILM COATED ORAL at 12:30

## 2022-01-01 RX ADMIN — EPINEPHRINE 1 MG: 0.1 INJECTION INTRACARDIAC; INTRAVENOUS at 22:34

## 2022-01-01 RX ADMIN — CILOSTAZOL 50 MG: 100 TABLET ORAL at 20:23

## 2022-01-01 RX ADMIN — DILTIAZEM HYDROCHLORIDE 60 MG: 60 TABLET, FILM COATED ORAL at 23:39

## 2022-01-01 RX ADMIN — GLIPIZIDE 10 MG: 5 TABLET ORAL at 06:02

## 2022-01-01 RX ADMIN — AMIODARONE HYDROCHLORIDE 400 MG: 200 TABLET ORAL at 08:58

## 2022-01-01 RX ADMIN — INSULIN GLARGINE 30 UNITS: 100 INJECTION, SOLUTION SUBCUTANEOUS at 09:50

## 2022-01-01 RX ADMIN — AMIODARONE HYDROCHLORIDE 400 MG: 200 TABLET ORAL at 20:52

## 2022-01-01 RX ADMIN — ALBUTEROL SULFATE 2 PUFF: 90 AEROSOL, METERED RESPIRATORY (INHALATION) at 08:04

## 2022-01-01 RX ADMIN — SODIUM CHLORIDE, PRESERVATIVE FREE 10 ML: 5 INJECTION INTRAVENOUS at 09:47

## 2022-01-01 RX ADMIN — SODIUM CHLORIDE, PRESERVATIVE FREE 10 ML: 5 INJECTION INTRAVENOUS at 20:31

## 2022-01-01 RX ADMIN — METOPROLOL SUCCINATE 50 MG: 50 TABLET, EXTENDED RELEASE ORAL at 09:49

## 2022-01-01 RX ADMIN — SODIUM CHLORIDE, PRESERVATIVE FREE 10 ML: 5 INJECTION INTRAVENOUS at 09:18

## 2022-01-01 RX ADMIN — GLIPIZIDE 10 MG: 5 TABLET ORAL at 06:11

## 2022-01-01 RX ADMIN — GLIPIZIDE 10 MG: 5 TABLET ORAL at 17:27

## 2022-01-01 RX ADMIN — EPINEPHRINE 1 MG: 0.1 INJECTION INTRACARDIAC; INTRAVENOUS at 22:18

## 2022-01-01 RX ADMIN — CILOSTAZOL 50 MG: 100 TABLET ORAL at 09:48

## 2022-01-01 RX ADMIN — BUDESONIDE AND FORMOTEROL FUMARATE DIHYDRATE 2 PUFF: 80; 4.5 AEROSOL RESPIRATORY (INHALATION) at 19:51

## 2022-01-01 RX ADMIN — CILOSTAZOL 50 MG: 100 TABLET ORAL at 20:28

## 2022-01-01 RX ADMIN — CILOSTAZOL 50 MG: 100 TABLET ORAL at 11:27

## 2022-01-01 RX ADMIN — DIGOXIN 500 MCG: 250 TABLET ORAL at 12:13

## 2022-01-01 RX ADMIN — BUDESONIDE AND FORMOTEROL FUMARATE DIHYDRATE 2 PUFF: 80; 4.5 AEROSOL RESPIRATORY (INHALATION) at 19:36

## 2022-01-01 RX ADMIN — FLUMAZENIL 0.2 MG: 0.1 INJECTION, SOLUTION INTRAVENOUS at 14:26

## 2022-01-01 RX ADMIN — APIXABAN 5 MG: 5 TABLET, FILM COATED ORAL at 08:12

## 2022-01-01 RX ADMIN — APIXABAN 5 MG: 5 TABLET, FILM COATED ORAL at 21:27

## 2022-01-01 RX ADMIN — AMIODARONE HYDROCHLORIDE 200 MG: 200 TABLET ORAL at 21:27

## 2022-01-01 RX ADMIN — APIXABAN 5 MG: 5 TABLET, FILM COATED ORAL at 20:09

## 2022-01-01 RX ADMIN — DIGOXIN 125 MCG: 125 TABLET ORAL at 09:00

## 2022-01-01 ASSESSMENT — ENCOUNTER SYMPTOMS
CONSTIPATION: 0
COUGH: 0
NAUSEA: 0
VOMITING: 0
WHEEZING: 0
EYE PAIN: 0
PHOTOPHOBIA: 0
SHORTNESS OF BREATH: 1
NAUSEA: 0
COLOR CHANGE: 0
SHORTNESS OF BREATH: 0
CHEST TIGHTNESS: 0
DIARRHEA: 0
SHORTNESS OF BREATH: 1
BACK PAIN: 0
CHEST TIGHTNESS: 0
SHORTNESS OF BREATH: 1
SHORTNESS OF BREATH: 0
ABDOMINAL PAIN: 0
VOMITING: 0
BACK PAIN: 0
BLOOD IN STOOL: 0
ABDOMINAL PAIN: 0

## 2022-01-01 ASSESSMENT — PAIN SCALES - GENERAL
PAINLEVEL_OUTOF10: 0

## 2022-10-24 PROBLEM — I48.91 NEW ONSET A-FIB (HCC): Status: ACTIVE | Noted: 2022-10-24

## 2022-10-24 PROBLEM — E66.01 CLASS 3 SEVERE OBESITY DUE TO EXCESS CALORIES WITH SERIOUS COMORBIDITY AND BODY MASS INDEX (BMI) OF 50.0 TO 59.9 IN ADULT (HCC): Status: ACTIVE | Noted: 2022-10-24

## 2022-10-24 PROBLEM — R06.02 SOB (SHORTNESS OF BREATH): Status: ACTIVE | Noted: 2022-01-01

## 2022-10-24 PROBLEM — I10 PRIMARY HYPERTENSION: Status: ACTIVE | Noted: 2022-01-01

## 2022-10-24 PROBLEM — E78.2 MIXED HYPERLIPIDEMIA: Status: ACTIVE | Noted: 2022-10-24

## 2022-10-24 PROBLEM — I48.91 ATRIAL FIBRILLATION WITH RVR (HCC): Status: ACTIVE | Noted: 2022-01-01

## 2022-10-24 NOTE — ED NOTES
Medication History  [facility]    Patient Name: Dinh Oneil 1945     Medication history has been completed by: Arian Sheppard CPhT    Source(s) of information: patient, family member, and insurance claims     Primary Care Physician: Mj Mathew MD     Pharmacy: [facility]    Allergies as of 10/24/2022    (No Known Allergies)        Prior to Admission medications    Medication Sig Start Date End Date Taking?  Authorizing Provider   metFORMIN (GLUCOPHAGE) 500 MG tablet Take 500 mg by mouth at bedtime Patient takes 500mg nightly at bedtime   Yes Historical Provider, MD   metFORMIN (GLUCOPHAGE) 1000 MG tablet Take 1,000 mg by mouth 2 times daily Patient takes one tablet in morning and one tablet in evening 10/4/22   Historical Provider, MD   furosemide (LASIX) 40 MG tablet Take 40 mg by mouth daily    Historical Provider, MD   fluticasone-vilanterol (BREO ELLIPTA) 100-25 MCG/INH AEPB inhaler Inhale 1 puff into the lungs daily 4/18/22 10/24/22  Kadi Meek MD   albuterol sulfate HFA (PROAIR HFA) 108 (90 Base) MCG/ACT inhaler Inhale 2 puffs into the lungs every 6 hours as needed for Wheezing 4/13/20   Kadi Meek MD   cilostazol (PLETAL) 50 MG tablet Take 1 tablet by mouth 2 times daily 3/2/20   MD IVON BurgessUS SOLOSTAR 100 UNIT/ML injection pen Inject 30 Units into the skin daily Patient takes 30 units subcutaneously in the morning 8/21/19   Historical Provider, MD   glimepiride (AMARYL) 4 MG tablet Take 4 mg by mouth every morning Patient is eating less, and so is only taking once daily due to worry of decreased blood sugar 1/1/14   Historical Provider, MD   levothyroxine (SYNTHROID) 200 MCG tablet Take 200 mcg by mouth Daily 11/4/13   Historical Provider, MD   lisinopril (PRINIVIL;ZESTRIL) 10 MG tablet 10 mg daily  1/12/14   Historical Provider, MD   KLOR-CON M20 20 MEQ tablet 40 mEq 3 times daily  11/7/13   Historical Provider, MD   CRESTOR 40 MG tablet Take 40 mg by mouth daily  12/22/13   Historical Provider, MD   OXYGEN Inhale 2 L into the lungs continuous. Historical Provider, MD       Medications added or changed (ex. new medication, dosage change, interval change, formulation change):  Metformin dosage clarified per patient  Lantus dosage clarified per patient  Glimepiride dose changed per patient, takes once daily due to low blood sugar    Medications removed from list (include reason, ex. noncompliance, medication cost, therapy complete etc.):   Clopidogrel discontinued by cardiologist  Removed albuterol HFA inhaler (duplicate rx)  Albuterol neb flagged for review. Medications requiring reconciliation with provider:    Patient claims cardiologist was going to start zytrol and asa after her visit, but she was sent to the hospital instead of home    Comments:  Medication history reviewed with patient and family. Patient had med list from home and discharge paperwork from Cardiologist appt. Earlier today.   Per patient daughter, cardiologist was going to change some meds before he told her to go to the hospital.    To my knowledge the above medication history is accurate as of 10/24/2022 5:00 PM.   Ruby Brunner CPhT   10/24/2022 5:00 PM

## 2022-10-24 NOTE — CONSULTS
CARDIOLOGY CONSULT NOTE   Reason for consultation: Atrial fibrillation with rapid ventricular rate    Referring physician:  No admitting provider for patient encounter. Primary care physician: Randa Severino MD      Chief Complaints :  Chief Complaint   Patient presents with    Irregular Heart Beat     Atrial fib        History of present illness:Angie is a 67 y. o.year old female who who has prior history of peripheral vascular disease, chronic respiratory failure, obstructive sleep apnea, morbid obesity who is sent from clinic due to atrial fibrillation with rapid ventricular rate. 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 At present she feels well and denies any chest pain, orthopnea, PND. She does not feel that her heart rate is that fast.  Past medical history:    has a past medical history of COPD (chronic obstructive pulmonary disease) ([address] H/O Doppler LOWER ARTERIAL ultrasound, H/O echocardiogram, Hypertension, PAD (peripheral artery disease) Periph Angio(HCC), Polycythemia, Respiratory failure, chronic ([address] and Type II or unspecified type diabetes mellitus without mention of complication, not stated as uncontrolled. Past surgical history:   has a past surgical history that includes Gallbladder surgery (1971). Social History:   reports that she quit smoking about 13 years ago. Her smoking use included cigarettes. She has a 0.13 pack-year smoking history. She has never used smokeless tobacco. She reports that she does not drink alcohol and does not use drugs.   Family history:   no family history of CAD, STROKE of DM at early age    No Known Allergies    dilTIAZem 100 mg in dextrose 5 % 100 mL infusion (ADD-[place]), Continuous  heparin (porcine) injection 4,000 Units, Once  heparin (porcine) injection 4,000 Units, PRN  heparin (porcine) injection 2,000 Units, PRN  heparin 25,000 unit in sodium chloride 0.45% 250 mL (premix) infusion, Continuous      Current Facility-Administered Medications   Medication Dose Route Frequency Provider Last Rate Last Admin    dilTIAZem 100 mg in dextrose 5 % 100 mL infusion (ADD-[place])  2.5-15 mg/hr IntraVENous Continuous José Miguel Barfield MD 5 mL/hr at 10/24/22 1602 5 mg/hr at 10/24/22 1602    heparin (porcine) injection 4,000 Units  4,000 Units IntraVENous Once José Miguel Barfield MD        heparin (porcine) injection 4,000 Units  4,000 Units IntraVENous PRN José Miguel Barfield MD        heparin (porcine) injection 2,000 Units  2,000 Units IntraVENous PRN José Miguel Barfield MD        heparin 25,000 unit in sodium chloride 0.45% 250 mL (premix) infusion  5-30 Units/kg/hr IntraVENous Continuous José Miguel Barfield MD         Current Outpatient Medications   Medication Sig Dispense Refill    metFORMIN (GLUCOPHAGE) 1000 MG tablet       furosemide (LASIX) 40 MG tablet Take 40 mg by mouth daily      fluticasone-vilanterol (BREO ELLIPTA) 100-25 MCG/INH AEPB inhaler Inhale 1 puff into the lungs daily 1 each 5    albuterol sulfate HFA (VENTOLIN HFA) 108 (90 Base) MCG/ACT inhaler Inhale 2 puffs into the lungs 4 times daily as needed for Wheezing 54 g 1    albuterol sulfate HFA (PROAIR HFA) 108 (90 Base) MCG/ACT inhaler Inhale 2 puffs into the lungs every 6 hours as needed for Wheezing 1 Inhaler 5    cilostazol (PLETAL) 50 MG tablet Take 1 tablet by mouth 2 times daily 60 tablet 3    LANTUS SOLOSTAR 100 UNIT/ML injection pen daily      ipratropium-albuterol (DUONEB) 0.5-2.5 (3) MG/3ML SOLN nebulizer solution Inhale 3 mLs into the lungs every 6 hours as needed for Shortness of Breath.  (Patient not taking: Reported on 10/24/2022) 120 vial 5    glimepiride (AMARYL) 4 MG tablet Take 4 mg by mouth 2 times daily       levothyroxine (SYNTHROID) 125 MCG tablet Take 200 mcg by mouth Daily      lisinopril (PRINIVIL;ZESTRIL) 10 MG tablet 10 mg daily       KLOR-CON M20 20 MEQ tablet 40 mEq 3 times daily       CRESTOR 40 MG tablet Take 40 mg by mouth daily       KOMBIGLYZE XR 2.5-1000 MG TB24 1 tablet daily  (Patient not taking: Reported on 10/24/2022)      OXYGEN Inhale 2 L into the lungs continuous. Review of Systems:   Constitutional: No Fever or Weight Loss   Eyes: No Decreased Vision  ENT: No Headaches, Hearing Loss or Vertigo  Cardiovascular: As per HPI  Respiratory: As per HPI  Gastrointestinal: No abdominal pain, appetite loss, blood in stools, constipation, diarrhea or heartburn  Genitourinary: No dysuria, trouble voiding, or hematuria  Musculoskeletal:  No gait disturbance, weakness or joint complaints  Integumentary: No rash or pruritis  Neurological: No TIA or stroke symptoms  Psychiatric: No anxiety or depression  Endocrine: No malaise, fatigue or temperature intolerance  Hematologic/Lymphatic: No bleeding problems, blood clots or swollen lymph nodes  Allergic/Immunologic: No nasal congestion or hives  All systems negative except as marked. Physical Examination:    Vitals:    10/24/22 1636   BP: 106/67   Pulse: (!) 122   Resp: 26   Temp:    SpO2: 96%        General Appearance:  No distress, conversant    Constitutional:  No acute distress, Non-toxic appearance. HENT:  Normocephalic, Atraumatic,   Eyes:  PERRL, EOMI, Conjunctiva normal, No discharge. Respiratory:  No respiratory distress, No wheezing  Cardiovascular: Irregular heartbeat S1, S2, no murmurs, gallops. JVD wnl  Abdomen /GI:   Soft, No tenderness   Genitourinary: No costovertebral angle tenderness   Musculoskeletal: 3+ edema bilaterally  Integument:  Well hydrated, no rash   Neurologic:  Alert & oriented x 3, no focal deficits noted       Medical decision making and Data review:    Lab Review   No results for input(s): WBC, HGB, HCT, PLT in the last 72 hours. No results for input(s): NA, K, CL, CO2, PHOS, BUN, CREATININE, CA in the last 72 hours. No results for input(s): AST, ALT, ALB, BILIDIR, BILITOT, ALKPHOS in the last 72 hours. No results for input(s): TROPONINT in the last 72 hours. No results for input(s): PROBNP in the last 72 hours. Lab Results   Component Value Date    INR 0.96 02/10/2020    PROTIME 11.6 (L) 02/10/2020       EKG: (reviewed by myself): Atrial fibrillation with rapid ventricular rate of around 140 bpm    ECHO:(reviewed by myself) last echo from 2019 showed normal ejection fraction          All labs, medications and tests reviewed by myself including data  from outside source , patient and available family . Continue all other medications of all above medical condition listed as is. Impression and Recommendations:    Atrial fibrillation with rapid ventricular response  Chronic diastolic heart failure  Chronic peripheral vascular disease      67 y. o.year old with above medical history. She is hemodynamically stable. At present we will start with diltiazem drip as well as oral Cardizem. Once her heart rate is below 90 bpm the drip can be switched off. I will also give her heparin for anticoagulation. We will follow along. She has evidence of fluid overload therefore will recommend to give her Lasix 40 mg IV twice a day. We will also check an echocardiogram during this admission. Thank you  much for consult and giving us the opportunity in contributing in the care of this patient. Please feel free to call me for any questions.        Meron Cano MD, 10/24/2022 4:45 PM

## 2022-10-24 NOTE — PATIENT INSTRUCTIONS
Please be informed that if you contact our office outside of normal business hours the physician on call cannot help with any scheduling or rescheduling issues, procedure instruction questions or any type of medication issue. We advise you for any urgent/emergency that you go to the nearest emergency room! PLEASE CALL OUR OFFICE DURING NORMAL BUSINESS HOURS    Monday - Friday   8 am to 5 pm    [facility] [address]: [phone]    [place]:  [phone]      **It is YOUR responsibilty to bring medication bottles and/or updated medication list to [address] [place] [address]. This will allow us to better serve you and all your healthcare needs**      Thank you for allowing us to care for you today! We want to ensure we can follow your treatment plan and we strive to give you the best outcomes and experience possible. If you ever have a life threatening emergency and call 911 - for an ambulance (EMS)   Our providers can only care for you at:   [facility] or formerly [facility]. Even if you have someone take you or you drive yourself we can only care for you in a [facility] facility. Our providers are not setup at the other healthcare locations!

## 2022-10-24 NOTE — ED NOTES
Patient placed on bedside commode per request. Patient stated she needed to have a bowel movement. Carmen Thompson.  MYNOR Mancuso  10/24/22 0764

## 2022-10-24 NOTE — ED NOTES
ED TO INPATIENT SBAR HANDOFF    Patient Name: Kenya Vázquez   :  1945  67 y.o. MRN:  [id number]  Preferred Name  Buster Wilkerson  ED Room #:  TR02/02TR-02  Family/Caregiver Present yes (daughter and grand daughter)  Restraints no   Sitter no   Sepsis Risk Score Sepsis Risk Score: 3.31    Situation  Code Status: No Order No additional code details. Allergies: Patient has no known allergies. Weight: Patient Vitals for the past 96 hrs (Last 3 readings):   Weight   10/24/22 1721 (!) 343 lb (155.6 kg)     Arrived from: cardiologist office  Chief Complaint: a-fib  Chief Complaint   Patient presents with    Irregular [address] Problem/Diagnosis:  Active Problems:    * No active hospital problems. *  Resolved Problems:    * No resolved hospital problems. *    Imaging:   XR CHEST PORTABLE   Preliminary Result   Mild cardiac silhouette enlargement. No evidence of edema or pneumonia. Abnormal labs:   Abnormal Labs Reviewed   CBC WITH AUTO DIFFERENTIAL - Abnormal; Notable for the following components:       Result Value    Hemoglobin 12.4 (*)     MCHC 30.0 (*)     Monocytes % 8.7 (*)     All other components within normal limits   MAGNESIUM - Abnormal; Notable for the following components:    Magnesium 1.5 (*)     All other components within normal limits     Critical values: no     Abnormal Assessment Findings: none    Background  History:   Past Medical History:   Diagnosis Date    COPD (chronic obstructive pulmonary disease) ([facility])     H/O Doppler LOWER ARTERIAL ultrasound 2019    The Left CFA exhibits calcific plaquing with elevated PSV,  Right ABIs show Mild PAD. The Left AUDREY shows Moderate peripheral arterial disease. H/O echocardiogram 2019    EF 55-60%, Mild aortic stenosis with mean gradient of 13 mmHg.     Hypertension     PAD (peripheral artery disease) Periph Angio([facility]) 2020     Severe left inflow stenosis of left common iliac and left common femoral artery, will plan CSI atherectomy. Polycythemia     Respiratory failure, chronic (HCC)     Type II or unspecified type diabetes mellitus without mention of complication, not stated as uncontrolled        Assessment    Vitals/MEWS:    Level of Consciousness: Alert (0)   Vitals:    10/24/22 1715 10/24/22 1721 10/24/22 1815 10/24/22 1845   BP: (!) 147/99  (!) 104/90 (!) 91/56   Pulse: (!) 129  (!) 142 (!) 125   Resp: 20 24 22   Temp:       TempSrc:       SpO2: 96%  94% 93%   Weight:  (!) 343 lb (155.6 kg)     Height:  5' 8\" (1.727 m)       FiO2 (%): none  O2 Flow Rate: O2 Device: Nasal cannula O2 Flow Rate (L/min): 2 L/min  Cardiac Rhythm:    Pain Assessment: none [x] Verbal [] Nelma Braver Scale  Pain Scale: Pain Assessment  Pain Assessment: None - Denies Pain  Last documented pain score (0-10 scale)    Last documented pain medication administered: none  Mental Status: oriented, alert, coherent, logical, thought processes intact, and able to concentrate and follow conversation  C-SSRS: Risk of Suicide: No Risk  Bedside swallow:    [place] Coma Scale (GCS): Active LDA's:   Peripheral IV 10/24/22 Right Forearm (Active)       Peripheral IV 10/24/22 Left Forearm (Active)     PO Status: unknown  Pertinent or High Risk Medications/Drips:   If Yes, please provide details: magnesium, heparin, diltiazem  Pending Blood Product Administration: no     You may also review the ED PT Care Timeline found under the Summary Nursing Index tab. Recommendation  no  Pending orders no  Plan for Discharge (if known): Additional Comments: none   If any further questions, please call Sending RN at [phone]    Electronically signed by: Electronically signed by Abby Simental. MYNOR Mancuso on 10/24/2022 at 7:07 PM      Abby Simental.  MYNOR Mancuso  10/24/22 6217

## 2022-10-24 NOTE — ASSESSMENT & PLAN NOTE
- In 2020 patient had angiogram which showed severe left inflow stenosis of left common iliac and left common femoral artery.   Plan was to have CSI arthrectomy but patient failed to follow-up after procedure.    -Patient has remained on Plavix and Pletal since procedure

## 2022-10-24 NOTE — ED PROVIDER NOTES
Emergency Department Encounter    Patient: Judge Monson  MRN: [id number]  : 1945  Date of Evaluation: 10/24/2022  ED Provider:  Agus Hay MD    Triage Chief Complaint:   Irregular Heart Beat (Atrial fib)    [place]:  Judge Monson is a 67 y.o. female that presents with concern for new onset A. fib, shortness of breath and leg swelling. She was seen at the cardiology office for leg swelling that has been worsening over several weeks. Had been told prior to [facility] starting in 2019 that she had a blockage in her leg, so she was following up on this. She does use oxygen chronically at home. Has a history of COPD, hypertension, PAD, polycythemia and diabetes. No previous known cardiac disease. She was seen in the clinic today and they found her to be in atrial fibrillation with rapid ventricular response. She does not feel the palpitations. Has not had any chest pain. She does get winded even with just walking short distances at home, and that was new over the last several months. Gradually worsening, daughter had noted how much her legs and swollen this last week and they had made the appointment. ROS - see HPI, below listed is current ROS at time of my eval:  10 systems reviewed negative except as above    Past Medical History:   Diagnosis Date    COPD (chronic obstructive pulmonary disease) ([address]     H/O Doppler LOWER ARTERIAL ultrasound 2019    The Left CFA exhibits calcific plaquing with elevated PSV,  Right ABIs show Mild PAD. The Left AUDREY shows Moderate peripheral arterial disease. H/O echocardiogram 2019    EF 55-60%, Mild aortic stenosis with mean gradient of 13 mmHg. Hypertension     PAD (peripheral artery disease) Periph Angio(HCC) 2020     Severe left inflow stenosis of left common iliac and left common  femoral artery, will plan CSI atherectomy.     Polycythemia     Respiratory failure, chronic ([facility])     Type II or unspecified type diabetes mellitus without mention of complication, not stated as uncontrolled      Past Surgical History:   Procedure Laterality Date    GALLBLADDER SURGERY  1971     Family History   Problem Relation Age of Onset    Diabetes Mother     Hypertension Mother     Diabetes Father     Hypertension Father     Heart Attack Father     Diabetes Sister     Hypertension Sister     Breast Cancer Sister     Diabetes Brother     Hypertension Brother     Heart Attack Brother     Cancer Paternal Grandmother      Social History     Socioeconomic History    Marital status:       Spouse name: Not on file    Number of children: Not on file    Years of education: Not on file    Highest education level: Not on file   Occupational History    Not on file   Tobacco Use    Smoking status: Former     Packs/day: 0.25     Years: 0.50     Pack years: 0.13     Types: Cigarettes     Quit date: 2009     Years since quittin.7    Smokeless tobacco: Never   Vaping Use    Vaping Use: Never used   Substance and Sexual Activity    Alcohol use: Never     Alcohol/week: 0.0 standard drinks    Drug use: Never    Sexual activity: Not on file   Other Topics Concern    Not on file   Social History Narrative    Not on file     Social Determinants of Health     Financial Resource Strain: Not on file   Food Insecurity: Not on file   Transportation Needs: Not on file   Physical Activity: Not on file   Stress: Not on file   Social Connections: Not on file   Intimate Partner Violence: Not on file   Housing Stability: Not on file     Current Facility-Administered Medications   Medication Dose Route Frequency Provider Last Rate Last Admin    dilTIAZem 100 mg in dextrose 5 % 100 mL infusion (ADD-[place])  2.5-15 mg/hr IntraVENous Continuous Ghanshyam Jacobo MD 15 mL/hr at 10/24/22 1934 15 mg/hr at 10/24/22 1934    heparin (porcine) injection 4,000 Units  4,000 Units IntraVENous PRN Ghanshyam Jacobo MD        heparin (porcine) injection 2,000 Units  2,000 Units IntraVENous PRN Meet Rodriguez MD        heparin 25,000 unit in sodium chloride 0.45% 250 mL (premix) infusion  5-30 Units/kg/hr IntraVENous Continuous Meet Rodriguez MD 18.7 mL/hr at 10/24/22 1714 12 Units/kg/hr at 10/24/22 1714    [START ON 10/25/2022] furosemide (LASIX) injection 40 mg  40 mg IntraVENous BID TONIE Quintero - CNP         Current Outpatient Medications   Medication Sig Dispense Refill    metFORMIN (GLUCOPHAGE) 500 MG tablet Take 500 mg by mouth at bedtime Patient takes 500mg nightly at bedtime      metFORMIN (GLUCOPHAGE) 1000 MG tablet Take 1,000 mg by mouth 2 times daily Patient takes one tablet in morning and one tablet in evening      furosemide (LASIX) 40 MG tablet Take 40 mg by mouth daily      fluticasone-vilanterol (BREO ELLIPTA) 100-25 MCG/INH AEPB inhaler Inhale 1 puff into the lungs daily 1 each 5    albuterol sulfate HFA (PROAIR HFA) 108 (90 Base) MCG/ACT inhaler Inhale 2 puffs into the lungs every 6 hours as needed for Wheezing 1 Inhaler 5    cilostazol (PLETAL) 50 MG tablet Take 1 tablet by mouth 2 times daily 60 tablet 3    LANTUS SOLOSTAR 100 UNIT/ML injection pen Inject 30 Units into the skin daily Patient takes 30 units subcutaneously in the morning      ipratropium-albuterol (DUONEB) 0.5-2.5 (3) MG/3ML SOLN nebulizer solution Inhale 3 mLs into the lungs every 6 hours as needed for Shortness of Breath. (Patient not taking: No sig reported) 120 vial 5    glimepiride (AMARYL) 4 MG tablet Take 4 mg by mouth every morning Patient is eating less, and so is only taking once daily due to worry of decreased blood sugar      levothyroxine (SYNTHROID) 200 MCG tablet Take 200 mcg by mouth Daily      lisinopril (PRINIVIL;ZESTRIL) 10 MG tablet Take 10 mg by mouth daily      KLOR-CON M20 20 MEQ tablet Take 40 mEq by mouth 3 times daily      CRESTOR 40 MG tablet Take 40 mg by mouth daily       OXYGEN Inhale 2 L into the lungs continuous.        No Known Allergies    Nursing Notes Reviewed    Physical Exam:  ED Triage Vitals   Enc Vitals Group      BP 10/24/22 1515 (!) 90/53      Heart Rate 10/24/22 1527 (!) 152      Resp 10/24/22 1527 (!) 35      Temp 10/24/22 1515 98.1 °F (36.7 °C)      Temp Source 10/24/22 1515 Oral      SpO2 10/24/22 1527 90 %      Weight --       Height --       Head Circumference --       Peak Flow --       Pain Score --       Pain Loc --       Pain Edu? --       Excl. in [address]? --            My pulse ox interpretation is - normal    General appearance:  No acute distress. Skin:  Warm. Dry. Eye:  Extraocular movements intact. Ears, nose, mouth and throat:  Oral mucosa moist   Neck:  Trachea midline. Extremity:  No swelling. Normal ROM     Heart: tachycardic with irregular rate and rhythm, normal S1 & S2, no extra heart sounds. Perfusion:  intact  Respiratory:  Lungs clear to auscultation bilaterally. Respirations nonlabored. Abdominal:  Soft. Nontender. Non distended.   Neurological:  Alert and oriented           Psychiatric:  Appropriate    I have reviewed and interpreted all of the currently available lab results from this visit (if applicable):  Results for orders placed or performed during the hospital encounter of 10/24/22   CBC with Auto Differential   Result Value Ref Range    WBC 7.6 4.0 - 10.5 K/CU MM    RBC 4.32 4.2 - 5.4 M/CU MM    Hemoglobin 12.4 (L) 12.5 - 16.0 GM/DL    Hematocrit 41.3 37 - 47 %    MCV 95.6 78 - 100 FL    MCH 28.7 27 - 31 PG    MCHC 30.0 (L) 32.0 - 36.0 %    RDW 14.6 11.7 - 14.9 %    Platelets 149 593 - 410 K/CU MM    MPV 11.0 7.5 - 11.1 FL    Differential Type AUTOMATED DIFFERENTIAL     Segs Relative 61.7 36 - 66 %    Lymphocytes % 26.0 24 - 44 %    Monocytes % 8.7 (H) 0 - 4 %    Eosinophils % 2.6 0 - 3 %    Basophils % 0.7 0 - 1 %    Segs Absolute 4.7 K/CU MM    Lymphocytes Absolute 2.0 K/CU MM    Monocytes Absolute 0.7 K/CU MM    Eosinophils Absolute 0.2 K/CU MM    Basophils Absolute 0.1 K/CU MM    Nucleated RBC % 0.0 %    Total Nucleated RBC 0.0 K/CU MM    Total Immature Neutrophil 0.02 K/CU MM    Immature Neutrophil % 0.3 0 - 0.43 %   CMP   Result Value Ref Range    Sodium 139 135 - 145 MMOL/L    Potassium 4.3 3.5 - 5.1 MMOL/L    Chloride 101 99 - 110 mMol/L    CO2 26 21 - 32 MMOL/L    BUN 10 6 - 23 MG/DL    Creatinine 0.8 0.6 - 1.1 MG/DL    Est, Glom Filt Rate >60 >60 mL/min/1.73m2    Glucose 83 70 - 99 MG/DL    Calcium 9.5 8.3 - 10.6 MG/DL    Albumin 4.1 3.4 - 5.0 GM/DL    Total Protein 6.9 6.4 - 8.2 GM/DL    Total Bilirubin 0.7 0.0 - 1.0 MG/DL    ALT 10 10 - 40 U/L    AST 15 15 - 37 IU/L    Alkaline Phosphatase 46 40 - 129 IU/L    Anion Gap 12 4 - 16   Magnesium   Result Value Ref Range    Magnesium 1.5 (L) 1.8 - 2.4 mg/dl   Troponin   Result Value Ref Range    Troponin T <0.010 <0.01 NG/ML   APTT   Result Value Ref Range    aPTT 30.4 25.1 - 37.1 SECONDS   SPECIMEN REJECTION   Result Value Ref Range    Rejected Test CBCND     Reason for Rejection DUPLICATE ORDER    EKG 12 Lead   Result Value Ref Range    Ventricular Rate 158 BPM    Atrial Rate 163 BPM    QRS Duration 64 ms    Q-T Interval 290 ms    QTc Calculation (Bazett) 470 ms    R Axis 92 degrees    T Axis 67 degrees    Diagnosis       Atrial fibrillation with rapid ventricular response  Rightward axis  Low voltage QRS  Nonspecific ST and T wave abnormality  Abnormal ECG  No previous ECGs available        Radiographs (if obtained):  Radiologist's Report Reviewed:  No results found. EKG (if obtained): (All EKG's are interpreted by myself in the absence of a cardiologist)  Atrial fibrillation with rapid ventricular response, rate of 158 bpm.  Nonspecific ST and T wave abnormality. No ST elevation. No previous to compare.       MDM:  69-year-old female with history as above presents with concern for new atrial fibrillation with rapid ventricular spots, and A. fib with a rate in the 150s, diltiazem has been ordered as well as heparin and I did discuss this with her given her risk for blood clots.  She was agreeable. I will also give her a dose of IV Lasix. I will consult her cardiologist and we will get basic labs. Discussed with Dr. Clary Alejandra and he will come to see patient, agrees with plan for drips and lasix. Electrolytes appear to be stable. Plan for admission to hospitalist team, continues to do well. Heart rate is improving, as his blood pressure    Total critical care time today provided was 36minutes. This excludes seperately billable procedure. Critical care time provided for afib with RVR that required close evaluation and/or intervention with concern for patient decompensation. Clinical Impression:  1. Atrial fibrillation with rapid ventricular response ([address]    2. Hypervolemia, unspecified hypervolemia type    3. Hypomagnesemia      Disposition referral (if applicable):  No follow-up provider specified. Disposition medications (if applicable):  New Prescriptions    No medications on file     ED Provider Disposition Time  DISPOSITION Admitted 10/24/2022 07:38:35 PM      Comment: Please note this report has been produced using speech recognition software and may contain errors related to that system including errors in grammar, punctuation, and spelling, as well as words and phrases that may be inappropriate. Efforts were made to edit the dictations.        Meet Rodriguez MD  10/24/22 2052

## 2022-10-24 NOTE — ASSESSMENT & PLAN NOTE
-At or near goal n/a No recent labs available-   -She is to continue current medications (Crestor 40 mg) Hepatic function panel WNL. No abdominal pain. No myalgias.     -The nature of cardiac risk has been fully discussed with this patient. I have made her aware of her LDL target goal given her cardiovascular risk analysis. I have discussed the appropriate diet. The need for lifelong compliance in order to reduce risk is stressed. A regular exercise program is recommended to help achieve and maintain normal body weight, fitness and improve lipid balance. A written copy of a low fat, low cholesterol diet has been given to the patient.

## 2022-10-24 NOTE — PROGRESS NOTES
[facility] [address]  Phone: [phone]    Fax [phone]                  Tessa Garcia MD, Micheline Spicer MD, Evita Munoz MD, MD Praveen Prajapati MD, Taylor Rowe MD, Chris Castaneda MD, [address] [facility], APRN       Bennetta Hover, APRN  Martha Bouquet, [facility]        Cardiology Progress Note      10/24/2022    RE: Robert Zarate  ([id number])                             Primary cardiologist: Dr. Praveen Feliz       Subjective:  CC:   1. PAD (peripheral artery disease) ([facility]    2. New onset a-fib ([facility]    3. SOB (shortness of breath)    4. Mixed hyperlipidemia    5. Class 3 severe obesity due to excess calories with serious comorbidity and body mass index (BMI) of 50.0 to 59.9 in adult ([facility]    6. Primary hypertension        HPI: Robert Zarate, who is a  67y.o. year old female with a past medical history as listed below. Patient presents to the office for follow up on PAD, SOB, HTN, and hyperlipidemia. In 2020 patient had angiogram which showed severe left inflow stenosis of left common iliac and left common femoral artery. Plan was to have CSI arthrectomy but patient failed to follow-up. Patient reports she was scheduled for arthrectomy but due to COVID shutdown procedure was canceled and did not follow-up after. She has been having difficulty with ongoing left lower extremity pain, difficulty ambulating. She had significant edema bilateral lower extremities and was started on Lasix per Dr. Estrella Pena. EKG obtained and patient found to be in A. fib RVR, is new onset. Past Medical History:   Diagnosis Date    COPD (chronic obstructive pulmonary disease) ([facility] [address] [facility]     H/O Doppler LOWER ARTERIAL ultrasound 11/06/2019    The Left CFA exhibits calcific plaquing with elevated PSV,  Right ABIs show Mild PAD. The Left AUDREY shows Moderate peripheral arterial disease. H/O echocardiogram 11/06/2019    EF 55-60%, Mild aortic stenosis with mean gradient of 13 mmHg. Hypertension     PAD (peripheral artery disease) Periph Angio([facility]) 02/11/2020     Severe left inflow stenosis of left common iliac and left common  femoral artery, will plan CSI atherectomy. Polycythemia     Respiratory failure, chronic ([facility])     Type II or unspecified type diabetes mellitus without mention of complication, not stated as uncontrolled        Current Outpatient Medications   Medication Sig Dispense Refill    metFORMIN (GLUCOPHAGE) 1000 MG tablet       furosemide (LASIX) 40 MG tablet Take 40 mg by mouth daily      albuterol sulfate HFA (VENTOLIN HFA) 108 (90 Base) MCG/ACT inhaler Inhale 2 puffs into the lungs 4 times daily as needed for Wheezing 54 g 1    albuterol sulfate HFA (PROAIR HFA) 108 (90 Base) MCG/ACT inhaler Inhale 2 puffs into the lungs every 6 hours as needed for Wheezing 1 Inhaler 5    cilostazol (PLETAL) 50 MG tablet Take 1 tablet by mouth 2 times daily 60 tablet 3    LANTUS SOLOSTAR 100 UNIT/ML injection pen daily      glimepiride (AMARYL) 4 MG tablet Take 4 mg by mouth 2 times daily       levothyroxine (SYNTHROID) 125 MCG tablet Take 200 mcg by mouth Daily      lisinopril (PRINIVIL;ZESTRIL) 10 MG tablet 10 mg daily       KLOR-CON M20 20 MEQ tablet 40 mEq 3 times daily       CRESTOR 40 MG tablet Take 40 mg by mouth daily       OXYGEN Inhale 2 L into the lungs continuous. fluticasone-vilanterol (BREO ELLIPTA) 100-25 MCG/INH AEPB inhaler Inhale 1 puff into the lungs daily 1 each 5    ipratropium-albuterol (DUONEB) 0.5-2.5 (3) MG/3ML SOLN nebulizer solution Inhale 3 mLs into the lungs every 6 hours as needed for Shortness of Breath. (Patient not taking: Reported on 10/24/2022) 120 vial 5    KOMBIGLYZE XR 2.5-1000 MG TB24 1 tablet daily  (Patient not taking: Reported on 10/24/2022)       No current facility-administered medications for this visit.        Review of Systems:  Review of Component Value Date    ALT 20 10/22/2013    AST 21 10/22/2013     TSH:  No results found for: TSH    Vitals:    10/24/22 1301   BP: 110/62   Pulse: (!) 107   SpO2: 95%       Echo:10/29/19   Left ventricular systolic function is normal with an ejection fraction of   55-60%. Grade I diastolic dysfunction. Mild concentric left ventricular hypertrophy. The left atrium is mildly dilated. Mild aortic stenosis with mean gradient of 13 mmHg. No evidence of pericardial effusion. Cath:2/11/20  Abdominal aorta: normal   Rt common iliac: patent   Left common iliac:\" severe calcified stenosis at the ostium 80%   \   left common femoral artery 80% calcified stenosis   Rt SFA: patent   Left SFA patent   B/l Popliteal: patent   Rt BREE: patent   Rt Peroneal: patent   Rt PTA: patent   Left: PTA: patent   Left Peroneal: patent   Left PTA patent      There were no complications during the case, patient is   transfered to the floor in stable condition. Procedure Summary   severe left inflow stenosis of left common iliac and left common   femoral artery, will plan CSI atherectomy         The ASCVD Risk score ([place] DK, et al., 2019) failed to calculate for the following reasons:    Cannot find a previous HDL lab    Cannot find a previous total cholesterol lab      Assessment/ Plan:     New onset Afib RVR  -EKG shows new onset A. fib RVR . Patient reports ongoing shortness of breath for the last 3 months with lower extremity edema. She denies any palpitations, fluttering, chest pain, or syncope. She was recently started on Lasix per endocrinology for lower extremity edema.    -Discussed transport for EMS patient declined. Patient was brought to office by daughter who agrees to take patient to emergency department for further work-up. Case also discussed with Dr. Gilberto Posadas, who briefly assessed patient.     -Case discussed with on-call cardiologist Dr. Tiffany Shannon.   Plan for admit with Cardizem drip and aggressive diuresis. If patient does not cardiovert with medication intervention we will schedule SAMMY cardioversion. High [id number] score discussed anticoagulation need with patient who is in agreements. We will titrate that hospital.    SOB (shortness of breath)   - Patient has COPD with chronic respiratory failure requiring continuous oxygen supplement. New onset A. fib RVR. Patient reports edema has progressed over the last 3 months but shortness of breath has been present throughout. PAD (peripheral artery disease) ([address]   - In 2020 patient had angiogram which showed severe left inflow stenosis of left common iliac and left common femoral artery. Plan was to have CSI arthrectomy but patient failed to follow-up after procedure. She reports COVID-19 shut down occurred and procedure was canceled. -Patient has remained on Plavix and Pletal since procedure. Once acute A. fib resolved we will readdress intervention. Can consider arterial ultrasound while hospitalized. Mixed hyperlipidemia   -At or near goal n/a No recent labs available- Will recheck.   -She is to continue current medications (Crestor 40 mg) Hepatic function panel WNL. No abdominal pain. No myalgias. Obesity  -Risks of comorbidities and mortality with obesity discussed. Unfortunately patient has difficulty with ambulation and is unable to exercise. Primary hypertension  -Currently stable will likely need adjustment of lisinopril given current BP. Patient seen, interviewed and examined. Testing was reviewed. -56 min spent with patient. Call for any problems, questions, or concerns. Pt is to follow up in 2 weeks or Cardiac management    Electronically signed by J Carlos Joy.  TONIE Guevara - CNP on 10/24/2022 at 2:36 PM

## 2022-10-24 NOTE — H&P
History and Physical      Name:  Tala Alcocer /Age/Sex: 1945  (67 y.o. female)   MRN & CSN:  [id number] & [id number] Admission Date/Time: 10/24/2022  2:44 PM   Location:   PCP: Efraín Multani MD       Hospital Day: 1     Attending physician: Dr. Reginaldo Cortez and Plan:   Tala Alcocer is a 67 y.o.  female  who presents with Atrial fibrillation with RVR ([address] sent in by cardiology    Assessment and plan:     Atrial fibrillation with rapid ventricular response- CHADS Vasc score-5  Patient went to cardiology visit was found to be in A. fib RVR and sent to the emergency room patient denies any known history of atrial fibrillation  -Inpatient  Patient is asymptomatic, she is hemodynamically stable.  -Cardizem bolus of 10 mg given in ER  -Continue Cardizem drip and titrate  -Target HR <110 to avoid tachycardia-mediated cardiomyopathy  -1L NS bolus  -Check 2D echocardiogram in AM  -Check TSH, FT4, Mg, electrolytes  -serial cardiac enzymes  -cardiologic consultation    Hypomagnesemia: 1.5  Magnesium sulfate 2 g given in ER  -Recheck magnesium in a.m. replete as needed    Chronic diastolic heart failure :  last echo EF 55 to 60%. Grade 1 DD. Chest x-ray demonstrates no evidence of edema or pneumonia. Mild aortic stenosis  -Bilateral lower extremity pitting edema 2+. -Lasix 40 mg twice daily  -Hold p.o. Lasix  -2D echo pending  -Telemetry monitoring  -Intake and output monitoring-Daily weights    Diabetes Mellitus type II-controlled with long tern use of insulin. managed on Lantus, metformin, glimepiride, hold long acting antiglycemics,  no recent A1c on file. Blood glucose on admission.   - SSI  - held home PO meds  - continue home lantus  -Hemoglobin A1c pending  - POCT glucose qACHS  - hypoglycemia management protocol   - target blood glucose 100-180  - ADA carb controlled diet    Chronic respiratory failure COPD: On supplemental oxygen 2 L at baseline.   Does not appear exacerbation.  -Continue home oxygen  -Continue home MDI  -Continuous pulse ox    Peripheral vascular disease: Known to have left common iliac and left common femoral stenosis. This was demonstrated by angiogram in 2020. She was to follow-up to have a CSI arthrectomy but failed to follow-up for procedure as COVID-19 shutdown any procedures and was canceled. -Continue Pletal    Hyperlipidemia: Continue statin therapy 40 mg rosuvastatin nightly    Essential Hypertension- PTA regimen lisinopril 10 mg daily monitor BP trends. -continue home anti-hypertensive medication(s). Chronic Conditions: Continue all home medications except as stated above or contraindicated. Obesity class III BMI 52.15: kg/m2 Life style modifications  Follow-up PCP for longitudinal care      Disposition: Inpatient. Patient was accepted for continue evaluation and treatment and improvement of clinical symptoms. Discussed assessment and treatment plan with the admitting and supervising physician who agrees with the plan. Diet [de-identified]year-old, low sodium, 2 L restriction   DVT Prophylaxis [] Lovenox, [x]  Heparin, [] SCDs, [] Warfarin  [] NOAC     GI Prophylaxis [] PPI,  [] H2 Blocker,  [] Carafate,  [] Diet/Tube Feeds   Code Status All  Alternate decision makers-Jean Paul hummel and Dk     History of Present Illness:     Chief Complaint: Atrial fibrillation with RVR ([address]  Patrick Miranda is a 67 y.o.  female, with past medical history significant for COPD, chronic respiratory failure, diabetes mellitus, PVD, PRISCA hypertension, hyperlipidemia morbid obesity, who presented to the emergency room with complaint of irregular heart rate sent in by cardiology clinic. Patient has a known history of peripheral vascular disease of her left common iliac and left common femoral stenosis. She also has chronic respiratory failure and obstructive sleep apnea.   States she was aware of the calcifications prior to COVID but when everything shutdown and no procedures is being done she never returned to having followed up. States she has chronic lower extremity swelling is on Lasix for fluid. Chronically wears 2 L nasal cannula oxygen for COPD she denies any chest pain, chest tightness palpitations does endorse increased shortness of breath with exertion. Patient presented to cardiology clinic EKG was obtained which demonstrated A. fib with RVR with a heart rate in the 140s. States she has been having ongoing shortness of breath and worsening lower extremity edema over the past 3 months. States her endocrinologist recently started on Lasix due to the worsening of her lower extremity edema. Patient denies any history of DVTs or PEs. Denies any recent travels no recent illnesses no fever, chills or body aches. On Examination,the patient is able to state history and onset of symptoms. She appears no acute distress. States she never noticed her heart beating funny. He does endorse shortness of breath with exertion. .At the ED, vital signs;T 98.1,, RR 25, /91 mm/hg,SPO2 90% on 2 L NC/O2. Significant laboratories were the following: Magnesium 1.5  The patient was brought to the ED and was subsequently admitted for  further evaluation. and management          Review of Systems   Constitutional:  Negative for diaphoresis and fever. HENT:  Negative for congestion. Respiratory:  Positive for shortness of breath. Negative for chest tightness. Cardiovascular:  Positive for leg swelling. Negative for chest pain and palpitations. Gastrointestinal:  Negative for abdominal pain, nausea and vomiting. Genitourinary:  Negative for dysuria. Musculoskeletal:  Negative for back pain. Skin: Negative. Neurological:  Negative for dizziness and light-headedness. Hematological: Negative. Psychiatric/Behavioral: Negative.         Objective:   No intake or output data in the 24 hours ending 10/24/22 1954   Vitals:   Vitals:    10/24/22 1930   BP: 112/68   Pulse: (!) 123   Resp:    Temp:    SpO2: 95%     Physical Exam:   GEN- Awake female, NAD, sitting up in bed, morbidly obese,  appears to be stated age. EYES- Pupils are equally round. HENT- Mucous membranes are moist  NECK- Supple, no apparent thyromegaly or masses. RESP-Clear to auscultation, no wheezes, rales or rhonchi. Symmetric chest movement while on room air. CARDIO/VASC-tachycardic, irregular rate peripheral pulses equal bilaterally and palpable. GI- Abdomen is soft, no tenderness, masses, or guarding. Bowel sounds present. MSK- No gross joint deformities. EXTREMITIES- pulses intact, 2+ pitting edema,  no calf tenderness  SKIN- Normal coloration, warm, dry. NEURO-Cranial nerves appear grossly intact, normal speech, no lateralizing weakness. PSYCH-Awake, alert, oriented x 4. Past Medical History:      Past Medical History:   Diagnosis Date    COPD (chronic obstructive pulmonary disease) ([facility] [address]     H/O Doppler LOWER ARTERIAL ultrasound 11/06/2019    The Left CFA exhibits calcific plaquing with elevated PSV,  Right ABIs show Mild PAD. The Left AUDREY shows Moderate peripheral arterial disease. H/O echocardiogram 11/06/2019    EF 55-60%, Mild aortic stenosis with mean gradient of 13 mmHg. Hypertension     PAD (peripheral artery disease) Periph Angio([facility]) 02/11/2020     Severe left inflow stenosis of left common iliac and left common  femoral artery, will plan CSI atherectomy. Polycythemia     Respiratory failure, chronic (H[facility])     Type II or unspecified type diabetes mellitus without mention of complication, not stated as uncontrolled      PSHX:  has a past surgical history that includes Gallbladder surgery (1971). Allergies: No Known Allergies    FAM HX: family history includes Breast Cancer in her sister; Cancer in her paternal grandmother; Diabetes in her brother, father, mother, and sister;  Heart Attack in her brother and father; Hypertension in her brother, father, mother, and sister. Soc HX:   Social History     Socioeconomic History    Marital status:      Spouse name: None    Number of children: None    Years of education: None    Highest education level: None   Tobacco Use    Smoking status: Former     Packs/day: 0.25     Years: 0.50     Pack years: 0.13     Types: Cigarettes     Quit date: 2009     Years since quittin.7    Smokeless tobacco: Never   Vaping Use    Vaping Use: Never used   Substance and Sexual Activity    Alcohol use: Never     Alcohol/week: 0.0 standard drinks    Drug use: Never       Social and family history reviewed with patient/family. Medications:     Home Medication   Prior to Admission medications    Medication Sig Start Date End Date Taking? Authorizing Provider   metFORMIN (GLUCOPHAGE) 500 MG tablet Take 500 mg by mouth at bedtime Patient takes 500mg nightly at bedtime   Yes Historical Provider, MD   metFORMIN (GLUCOPHAGE) 1000 MG tablet Take 1,000 mg by mouth 2 times daily Patient takes one tablet in morning and one tablet in evening 10/4/22   Historical Provider, MD   furosemide (LASIX) 40 MG tablet Take 40 mg by mouth daily    Historical Provider, MD   fluticasone-vilanterol (BREO ELLIPTA) 100-25 MCG/INH AEPB inhaler Inhale 1 puff into the lungs daily 4/18/22 10/24/22  Aniket Menjivar MD   albuterol sulfate HFA (PROAIR HFA) 108 (90 Base) MCG/ACT inhaler Inhale 2 puffs into the lungs every 6 hours as needed for Wheezing 20   Aniket Menjivar MD   cilostazol (PLETAL) 50 MG tablet Take 1 tablet by mouth 2 times daily 3/2/20   María Elena Smith MD   LANTUS SOLOSTAR 100 UNIT/ML injection pen Inject 30 Units into the skin daily Patient takes 30 units subcutaneously in the morning 19   Historical Provider, MD   ipratropium-albuterol (DUONEB) 0.5-2.5 (3) MG/3ML SOLN nebulizer solution Inhale 3 mLs into the lungs every 6 hours as needed for Shortness of Breath.   Patient not taking: No sig reported 4/10/14   Aniket Menjivar MD glimepiride (AMARYL) 4 MG tablet Take 4 mg by mouth every morning Patient is eating less, and so is only taking once daily due to worry of decreased blood sugar 1/1/14   Historical Provider, MD   levothyroxine (SYNTHROID) 200 MCG tablet Take 200 mcg by mouth Daily 11/4/13   Historical Provider, MD   lisinopril (PRINIVIL;ZESTRIL) 10 MG tablet Take 10 mg by mouth daily 1/12/14   Historical Provider, MD   KLOR-CON M20 20 MEQ tablet Take 40 mEq by mouth 3 times daily 11/7/13   Historical Provider, MD   CRESTOR 40 MG tablet Take 40 mg by mouth daily  12/22/13   Historical Provider, MD   OXYGEN Inhale 2 L into the lungs continuous. Historical Provider, MD     Medications:    Infusions:    dilTIAZem (CARDIZEM) 100 mg in dextrose 5% 100 mL (ADD-[place]) 15 mg/hr (10/24/22 1934)    heparin (PORCINE) Infusion 12 Units/kg/hr (10/24/22 1714)     PRN Meds: heparin (porcine), 4,000 Units, PRN  heparin (porcine), 2,000 Units, PRN        Recent Labs     10/24/22  1705   WBC 7.6   HGB 12.4*   HCT 41.3         Recent Labs     10/24/22  1705      K 4.3      CO2 26   BUN 10   CREATININE 0.8     Recent Labs     10/24/22  1705   AST 15   ALT 10   BILITOT 0.7   ALKPHOS 46     No results for input(s): INR in the last 72 hours. Recent Labs     10/24/22  1705   TROPONINT <0.010        Imaging reviewed  XR CHEST PORTABLE    Result Date: 10/24/2022  EXAMINATION: ONE XRAY VIEW OF THE CHEST 10/24/2022 3:26 pm COMPARISON: 2/10/2020 HISTORY: ORDERING SYSTEM PROVIDED HISTORY: sob TECHNOLOGIST PROVIDED HISTORY: Reason for exam:->sob Reason for Exam: sob FINDINGS: The heart size is enlarged. There is no pneumothorax, edema or focal consolidation. The bones are demineralized. Patient body habitus limits the exam.     Mild cardiac silhouette enlargement. No evidence of edema or pneumonia.           Electronically signed by TONIE Lyons CNP on 10/24/2022 at 7:54 PM

## 2022-10-25 NOTE — CONSULTS
Endocrinology   Consult Note  10/24/2022  2:44 PM     Primary Care provider: Mj Mathew MD     Referring physician:  Hernandez Garcias MD     Dear Doctor Hernandez Garcias     Thank You for the Consult     Pt. Was Admitted for : Atrial fibrillation  with rapid ventricular respons was noted by cardiologist on patient's office visit to him    Reason for Consult: Better control of blood glucose      History Obtained From:  Patient/ EMR       HISTORY OF PRESENT ILLNESS:                The patient is a 67 y.o. female with significant past medical history of COPD, hypertension, PAD, polycythemia, history of respiratory failure on home oxygen 24/7 diabetes mellitus, hypothyroidism was seen in the cardiology office for peripheral vascular disease evaluation. She was noted to have atrial fibrillation with rapid ventricular response. This is seem to be a new onset of atrial fibrillation. Patient was immediately sent to the emergency room for for admission and further evaluation. I was  consulted for better control of blood glucose. ROS:   Pt's ROS done in detail. Abnormal ROS are noted in Medical and Surgical History Section below: Other Medical History:        Diagnosis Date    COPD (chronic obstructive pulmonary disease) ([address]     H/O Doppler LOWER ARTERIAL ultrasound 11/06/2019    The Left CFA exhibits calcific plaquing with elevated PSV,  Right ABIs show Mild PAD. The Left AUDREY shows Moderate peripheral arterial disease. H/O echocardiogram 11/06/2019    EF 55-60%, Mild aortic stenosis with mean gradient of 13 mmHg. Hypertension     PAD (peripheral artery disease) Periph Angio([facility]) 02/11/2020     Severe left inflow stenosis of left common iliac and left common  femoral artery, will plan CSI atherectomy.     Polycythemia     Respiratory failure, chronic ([facility])     Type II or unspecified type diabetes mellitus without mention of complication, not stated as uncontrolled      Surgical History:        Procedure Component Value Date/Time    T4FREE 1.75 10/24/2022 10:16 PM     Free T3: No results found for: FT3  TSH High Sensitivity:   Lab Results   Component Value Date/Time    TSHHS 0.793 10/24/2022 10:16 PM       XR CHEST PORTABLE   Preliminary Result   Mild cardiac silhouette enlargement. No evidence of edema or pneumonia. Scheduled Medicines   Medications:    insulin lispro  0-4 Units SubCUTAneous 2 times per day    glipiZIDE  10 mg Oral BID AC    cilostazol  50 mg Oral BID    rosuvastatin  40 mg Oral Daily    budesonide-formoterol  2 puff Inhalation BID    insulin glargine  30 Units SubCUTAneous Daily    levothyroxine  200 mcg Oral Daily    lisinopril  10 mg Oral Daily    sodium chloride flush  5-40 mL IntraVENous 2 times per day    insulin lispro  0-8 Units SubCUTAneous TID WC    furosemide  40 mg IntraVENous BID      Infusions:    dilTIAZem (CARDIZEM) 100 mg in dextrose 5% 100 mL ([facility]) 7.5 mg/hr (10/25/22 0656)    heparin (PORCINE) Infusion 14 Units/kg/hr (10/25/22 0656)    dextrose      sodium chloride           IMPRESSION    Patient Active Problem List   Diagnosis    Chronic respiratory failure (HCC)    COPD, mild (HCC)    PAD (peripheral artery disease) (HCC)    SOB (shortness of breath)    Mixed hyperlipidemia    New onset a-fib (HCC)    Class 3 severe obesity due to excess calories with serious comorbidity and body mass index (BMI) of 50.0 to 59.9 in adult [facility])    Primary hypertension    Atrial fibrillation with RVR ([facility]         RECOMMENDATIONS:      Reviewed POC blood glucose . Labs and X ray results   Reviewed Home and Current Medicines   Will Start On meal/ Correction bolus Humalog/ Lantus Insulin regime / OHGD   Monitor Blood glucose frequently   Modify  the dose of Insulin/ OHGD  as needed        Will follow with you  Again thank you for sharing pt's care with me.      Truly yours,       Rafiq Valles MD

## 2022-10-25 NOTE — PROGRESS NOTES
Dr Dung Aragon - Cardiology paged via [address] [place] [address] for new consult for discussion, sent from clinic.

## 2022-10-25 NOTE — PROGRESS NOTES
Cardiology Progress Note     Admit Date:  10/24/2022    Consult reason/ Seen today for :       Subjective and  Overnight Events : Remained stable. Her heart rate is still ranging around 110 bpm in atrial fibrillation. Past medical history:    has a past medical history of COPD (chronic obstructive pulmonary disease) ([facility] H/O Doppler LOWER ARTERIAL ultrasound, H/O echocardiogram, Hypertension, PAD (peripheral artery disease) Periph Angio(HCC), Polycythemia, Respiratory failure, chronic ([facility] and Type II or unspecified type diabetes mellitus without mention of complication, not stated as uncontrolled. Past surgical history:   has a past surgical history that includes Gallbladder surgery (1971). Social History:   reports that she quit smoking about 13 years ago. Her smoking use included cigarettes. She has a 0.13 pack-year smoking history. She has never used smokeless tobacco. She reports that she does not drink alcohol and does not use drugs. Family history:  family history includes Breast Cancer in her sister; Cancer in her paternal grandmother; Diabetes in her brother, father, mother, and sister; Heart Attack in her brother and father; Hypertension in her brother, father, mother, and sister. No Known Allergies    Review of Systems:    All 14 systems were reviewed and are negative  Except for the positive findings  which as documented       Physical Exam:  Vitals:    10/25/22 0825   BP: 106/63   Pulse: (!) 110   Resp: 25   Temp: 97.6 °F (36.4 °C)   SpO2: 93%        General Appearance:  No distress, conversant  Constitutional:  No acute distress, Non-toxic appearance. HENT:  Normocephalic, Atraumatic, Bilateral external ears normal, Oropharynx moist,   Eyes:  PERRL, EOMI, Conjunctiva normal, No discharge.    Respiratory:  Normal breath sounds, No respiratory distress, No wheezing  Cardiovascular: Irregular pulse S1, S2, no murmurs, gallops. JVD wnl  Abdomen /GI:  Bowel sounds normal, Soft, No tenderness   Genitourinary: No costovertebral angle tenderness   Musculoskeletal:  No edema, no tenderness, no deformities. Integument:  Well hydrated, no rash   Neurologic:  Alert & oriented x 3, no focal deficits noted       Medications:    insulin lispro  0-4 Units SubCUTAneous 2 times per day    glipiZIDE  10 mg Oral BID AC    magnesium sulfate  2,000 mg IntraVENous Once    dilTIAZem  60 mg Oral 4 times per day    cilostazol  50 mg Oral BID    rosuvastatin  40 mg Oral Daily    budesonide-formoterol  2 puff Inhalation BID    insulin glargine  30 Units SubCUTAneous Daily    levothyroxine  200 mcg Oral Daily    lisinopril  10 mg Oral Daily    sodium chloride flush  5-40 mL IntraVENous 2 times per day    insulin lispro  0-8 Units SubCUTAneous TID WC    furosemide  40 mg IntraVENous BID      dilTIAZem (CARDIZEM) 100 mg in dextrose 5% 100 mL ([facility]) 7.5 mg/hr (10/25/22 0656)    heparin (PORCINE) Infusion 14 Units/kg/hr (10/25/22 0922)    dextrose      sodium chloride       heparin (porcine), heparin (porcine), albuterol sulfate HFA, glucose, dextrose bolus **OR** dextrose bolus, glucagon (rDNA), dextrose, sodium chloride flush, sodium chloride, ondansetron **OR** ondansetron, polyethylene glycol, acetaminophen **OR** acetaminophen    Lab Data:  CBC:   Recent Labs     10/24/22  1705   WBC 7.6   HGB 12.4*   HCT 41.3   MCV 95.6        BMP:   Recent Labs     10/24/22  1705 10/25/22  0754    137   K 4.3 4.3    100   CO2 26 25   BUN 10 11   CREATININE 0.8 0.8     PT/INR:   Recent Labs     10/25/22  0754   PROTIME 13.9   INR 1.08     BNP:  No results for input(s): PROBNP in the last 72 hours.   TROPONIN:   Recent Labs     10/24/22  1705 10/24/22  2216 10/25/22  0754   TROPONINT <0.010 <0.010 <0.010        ECHO :   echocardiogram    Assessment and Recommendations:      Atrial fibrillation with rapid ventricular response  Chronic diastolic heart failure  Chronic peripheral vascular disease        67 y. o.year old with above medical history. She is hemodynamically stable. She remains on diltiazem drip. I will start her on diltiazem 60 mg p.o. every 6 hours. Once her heart rate is below 90 bpm the drip can be switched off. We can also stop the heparin and switch her to Eliquis 5 mg twice a day. We will continue with Lasix 40 mg IV twice daily for 1 more day. We will also check an echocardiogram during this admission. Thank you  much for consult and giving us the opportunity in contributing in the care of this patient. Please feel free to call me for any questions.          Addison Bahena MD, MD 10/25/2022 10:42 AM

## 2022-10-25 NOTE — PROGRESS NOTES
V2.0  [facility] [place]      Name:  Haim Clinton /Age/Sex: 1945  (67 y.o. female)   MRN & CSN:  [id number] & [id number] Encounter Date/Time: 10/25/2022 2:29 PM EDT    Location:  -A PCP: Ty Saavedra MD       Hospital Day: 2      Subjective:     Chief Complaint: Irregular Heart Beat (Atrial fib)     Patient remains on diltiazem drip 7.5 still tachycardic 110s. Has no complaints or concerns. Denies lightheadedness, dizziness, fever, night sweats, chills, chest pain, cough, dyspnea, palpitations, abd pain, nausea, vomiting, diarrhea, dysuria. Assessment and Plan:   Haim Clinton is a 67 y.o. female with pmh of COPD (2L), CHF, DM II, PVD, PRISCA who presents with Atrial fibrillation with RVR ([address]      Plan:  #Atrial fibrillation with rapid ventricular response  Still tachy on dilt drip, cardiology added dilt PO. Likely in the setting of volume overload and history of COPD and PRISCA. High ChadsVascore 5.  -per cardiology, continyue dilt drip until HR<90  - dilt PO oadded by cardiology  - anticoagulation switched to Eliquis by cardiolgy  - follow up cardiology recs  -Telemetry monitoring      #suspected acute on chronic diastolic heart failure. Diminished breathing sounds on exam, 2+ bilateral LE edema. ECHO 10/24/2022 EF 55-60%, moderately dilated RV. CXR clear.  -continue Lasix 40 mg IV BID  -Intake and output monitoring-Daily weights     #Hypomagnesemia  Replace as needed        #Diabetes Mellitus type II-  On home inslulin and oral meds. HA1C 5.8 10/24/2022.   - SSI  - endo following      #Chronic respiratory failure COPD. On 2L NC at home. Not in exacerbation.  -Continue home oxygen  -Continue home MDI  -Continuous pulse ox     #Peripheral vascular disease: Hx of left common iliac and left common femoral stenosis per angiogram in .   She was to follow-up to have a CSI arthrectomy but failed to follow-up for procedure as COVID-19 shutdown any procedures and was canceled. -Continue Pletal   - outpatient follow up    #Essential Hypertension- BP in the soft side given dilt drip.  - hold home lisinopril for now      Diet ADULT DIET; Regular; 4 carb choices (60 gm/meal); Low Sodium (2 gm); 2000 ml   DVT Prophylaxis [] Lovenox, []  Heparin, [] SCDs, [] Ambulation,  [x] Eliquis, [] Xarelto  [] Coumadin   Code Status Full Code   Disposition From: home  Expected Disposition: home  Estimated Date of Discharge: 1-2 days  Patient requires continued admission due to afib-VR   Surrogate Decision Maker/ POA      Review of Systems:    Review of Systems    See above    Objective: Intake/Output Summary (Last 24 hours) at 10/25/2022 1442  Last data filed at 10/25/2022 0656  Gross per 24 hour   Intake 250 ml   Output 760 ml   Net -510 ml        Vitals:   Vitals:    10/25/22 1333   BP: 89/65   Pulse: (!) 110   Resp: 28   Temp: 97.6 °F (36.4 °C)   SpO2: 96%       Physical Exam:     General: NAD  Eyes: EOMI  ENT: neck supple  Cardiovascular: irregular rate and rhythm  Respiratory: diminished breathing sounds  Gastrointestinal: Soft, non tender  Genitourinary: no suprapubic tenderness  Musculoskeletal: 2+ bilateral edema  Skin: warm, dry  Neuro: Alert. Psych: Mood appropriate.      Medications:   Medications:    insulin lispro  0-4 Units SubCUTAneous 2 times per day    glipiZIDE  10 mg Oral BID AC    dilTIAZem  60 mg Oral 4 times per day    apixaban  5 mg Oral BID    furosemide  40 mg IntraVENous BID    cilostazol  50 mg Oral BID    rosuvastatin  40 mg Oral Daily    budesonide-formoterol  2 puff Inhalation BID    insulin glargine  30 Units SubCUTAneous Daily    levothyroxine  200 mcg Oral Daily    [Held by provider] lisinopril  10 mg Oral Daily    sodium chloride flush  5-40 mL IntraVENous 2 times per day    insulin lispro  0-8 Units SubCUTAneous TID WC      Infusions:    dilTIAZem (CARDIZEM) 100 mg in dextrose 5% 100 mL (ADD-[place]) 7.5 mg/hr (10/25/22 0656)    dextrose      sodium chloride       PRN Meds: albuterol sulfate HFA, 2 puff, Q6H PRN  glucose, 4 tablet, PRN  dextrose bolus, 125 mL, PRN   Or  dextrose bolus, 250 mL, PRN  glucagon (rDNA), 1 mg, PRN  dextrose, , Continuous PRN  sodium chloride flush, 5-40 mL, PRN  sodium chloride, , PRN  ondansetron, 4 mg, Q8H PRN   Or  ondansetron, 4 mg, Q6H PRN  polyethylene glycol, 17 g, Daily PRN  acetaminophen, 650 mg, Q6H PRN   Or  acetaminophen, 650 mg, Q6H PRN      Labs      Recent Results (from the past 24 hour(s))   CBC with Auto Differential    Collection Time: 10/24/22  5:05 PM   Result Value Ref Range    WBC 7.6 4.0 - 10.5 K/CU MM    RBC 4.32 4.2 - 5.4 M/CU MM    Hemoglobin 12.4 (L) 12.5 - 16.0 GM/DL    Hematocrit 41.3 37 - 47 %    MCV 95.6 78 - 100 FL    MCH 28.7 27 - 31 PG    MCHC 30.0 (L) 32.0 - 36.0 %    RDW 14.6 11.7 - 14.9 %    Platelets 882 179 - 088 K/CU MM    MPV 11.0 7.5 - 11.1 FL    Differential Type AUTOMATED DIFFERENTIAL     Segs Relative 61.7 36 - 66 %    Lymphocytes % 26.0 24 - 44 %    Monocytes % 8.7 (H) 0 - 4 %    Eosinophils % 2.6 0 - 3 %    Basophils % 0.7 0 - 1 %    Segs Absolute 4.7 K/CU MM    Lymphocytes Absolute 2.0 K/CU MM    Monocytes Absolute 0.7 K/CU MM    Eosinophils Absolute 0.2 K/CU MM    Basophils Absolute 0.1 K/CU MM    Nucleated RBC % 0.0 %    Total Nucleated RBC 0.0 K/CU MM    Total Immature Neutrophil 0.02 K/CU MM    Immature Neutrophil % 0.3 0 - 0.43 %   CMP    Collection Time: 10/24/22  5:05 PM   Result Value Ref Range    Sodium 139 135 - 145 MMOL/L    Potassium 4.3 3.5 - 5.1 MMOL/L    Chloride 101 99 - 110 mMol/L    CO2 26 21 - 32 MMOL/L    BUN 10 6 - 23 MG/DL    Creatinine 0.8 0.6 - 1.1 MG/DL    Est, Glom Filt Rate >60 >60 mL/min/1.73m2    Glucose 83 70 - 99 MG/DL    Calcium 9.5 8.3 - 10.6 MG/DL    Albumin 4.1 3.4 - 5.0 GM/DL    Total Protein 6.9 6.4 - 8.2 GM/DL    Total Bilirubin 0.7 0.0 - 1.0 MG/DL    ALT 10 10 - 40 U/L    AST 15 15 - 37 IU/L    Alkaline Phosphatase 46 40 - 129 IU/L    Anion Gap 12 4 - 16   Magnesium    Collection Time: 10/24/22  5:05 PM   Result Value Ref Range    Magnesium 1.5 (L) 1.8 - 2.4 mg/dl   Troponin    Collection Time: 10/24/22  5:05 PM   Result Value Ref Range    Troponin T <0.010 <0.01 NG/ML   Hemoglobin A1C    Collection Time: 10/24/22  5:05 PM   Result Value Ref Range    Hemoglobin A1C 5.8 4.2 - 6.3 %    eAG 120 mg/dL   APTT    Collection Time: 10/24/22  5:07 PM   Result Value Ref Range    aPTT 30.4 25.1 - 37.1 SECONDS   SPECIMEN REJECTION    Collection Time: 10/24/22  5:07 PM   Result Value Ref Range    Rejected Test CBCND     Reason for Rejection DUPLICATE ORDER    APTT    Collection Time: 10/24/22  9:00 PM   Result Value Ref Range    aPTT 60.0 (H) 25.1 - 37.1 SECONDS   POCT Glucose    Collection Time: 10/24/22  9:38 PM   Result Value Ref Range    POC Glucose 118 (H) 70 - 99 MG/DL   TSH without Reflex    Collection Time: 10/24/22 10:16 PM   Result Value Ref Range    TSH, High Sensitivity 0.793 0.270 - 4.20 uIu/ml   T4, free    Collection Time: 10/24/22 10:16 PM   Result Value Ref Range    T4 Free 1.75 0.9 - 1.8 NG/DL   Troponin    Collection Time: 10/24/22 10:16 PM   Result Value Ref Range    Troponin T <0.010 <0.01 NG/ML   POCT Glucose    Collection Time: 10/25/22  6:36 AM   Result Value Ref Range    POC Glucose 143 (H) 70 - 99 MG/DL   APTT    Collection Time: 10/25/22  7:54 AM   Result Value Ref Range    aPTT 81.5 (H) 25.1 - 37.1 SECONDS   Protime-INR    Collection Time: 10/25/22  7:54 AM   Result Value Ref Range    Protime 13.9 11.7 - 14.5 SECONDS    INR 1.08 INDEX   Troponin    Collection Time: 10/25/22  7:54 AM   Result Value Ref Range    Troponin T <0.010 <0.01 NG/ML   Basic Metabolic Panel w/ Reflex to MG    Collection Time: 10/25/22  7:54 AM   Result Value Ref Range    Sodium 137 135 - 145 MMOL/L    Potassium 4.3 3.5 - 5.1 MMOL/L    Chloride 100 99 - 110 mMol/L    CO2 25 21 - 32 MMOL/L    Anion Gap 12 4 - 16    BUN 11 6 - 23 MG/DL    Creatinine 0.8 0.6 - 1.1 MG/DL Est, Glom Filt Rate >60 >60 mL/min/1.73m2    Glucose 150 (H) 70 - 99 MG/DL    Calcium 9.4 8.3 - 10.6 MG/DL   Magnesium    Collection Time: 10/25/22  7:54 AM   Result Value Ref Range    Magnesium 1.6 (L) 1.8 - 2.4 mg/dl   SPECIMEN REJECTION    Collection Time: 10/25/22  7:54 AM   Result Value Ref Range    Rejected Test [facility]     Reason for Rejection SPECIMEN QUANTITY NOT SUFFICIENT    POCT Glucose    Collection Time: 10/25/22 11:47 AM   Result Value Ref Range    POC Glucose 156 (H) 70 - 99 MG/DL        Imaging/Diagnostics Last 24 Hours   XR CHEST PORTABLE    Result Date: 10/25/2022  EXAMINATION: ONE XRAY VIEW OF THE CHEST 10/24/2022 3:26 pm COMPARISON: 2/10/2020 HISTORY: ORDERING SYSTEM PROVIDED HISTORY: sob TECHNOLOGIST PROVIDED HISTORY: Reason for exam:->sob Reason for Exam: sob FINDINGS: The heart size is enlarged. There is no pneumothorax, edema or focal consolidation. The bones are demineralized. Patient body habitus limits the exam.     Mild cardiac silhouette enlargement. No evidence of edema or pneumonia.        Electronically signed by Nancy Yeung MD on 10/25/2022 at 2:42 PM

## 2022-10-26 NOTE — PROGRESS NOTES
Discussed with cardiology regarding pt's blood pressure and HR. Added parameters for diltiazem PO; can be given as long as SBP>85. If blood pressure drops after receiving the scheduled diltiazem, can give digoxin 500 mcg.     Maria Dolores Arceo MD  Hospitalist

## 2022-10-26 NOTE — PROGRESS NOTES
V2.0  [facility] [place] Hospitalist Progress Note      Name:  Linus Cormier /Age/Sex: 1945  (67 y.o. female)   MRN & CSN:  [id number] & [id number] Encounter Date/Time: 10/26/2022 2:29 PM EDT    Location:  -A PCP: Liza Ayala MD       Hospital Day: 3      Subjective:     Chief Complaint: Irregular Heart Beat (Atrial fib)     Patient was switched to diltiazem p.o. but her heart rate is still in the 120s. Blood pressure soft 90s to 100. Digoxin added by cardiology today. Lasix was changed to p.o. by cardiology. Denies lightheadedness, dizziness, fever, night sweats, chills, chest pain, cough, dyspnea, palpitations, abd pain, nausea, vomiting, diarrhea, dysuria. Assessment and Plan:   Linus Cormier is a 67 y.o. female with pmh of COPD (2L), CHF, DM II, PVD, PRISCA who presents with Atrial fibrillation with RVR ([facility] [address]      Plan:  #Atrial fibrillation with rapid ventricular response  Still tachy 120's on dilt PO, cardiology added digoxin. Likely in the setting of volume overload and history of COPD and PRISCA. High ChadsVascore 5.  -per cardiology, continue dilt PO hold if SBP<85, continue digoxin  - continue Eliquis by cardiolgy  - follow up cardiology recs  -Telemetry monitoring      #suspected acute on chronic diastolic heart failure - improving. ECHO 10/24/2022 EF 55-60%, moderately dilated RV. CXR clear.  -continue Lasix PO  -Intake and output monitoring-Daily weights     #Hypomagnesemia  Replace as needed        #Diabetes Mellitus type II-  On home inslulin and oral meds. HA1C 5.8 10/24/2022.   - SSI  - endo following      #Chronic respiratory failure COPD. On 2L NC at home. Not in exacerbation.  -Continue home oxygen  -Continue home MDI  -Continuous pulse ox     #Peripheral vascular disease: Hx of left common iliac and left common femoral stenosis per angiogram in .   She was to follow-up to have a CSI arthrectomy but failed to follow-up for procedure as COVID-19 shutdown any procedures and was canceled. -Continue Pletal   - outpatient follow up    #Essential Hypertension- BP in the soft side given dilt drip.  - hold home lisinopril for now      Diet ADULT DIET; Regular; 4 carb choices (60 gm/meal); Low Sodium (2 gm); 2000 ml   DVT Prophylaxis [] Lovenox, []  Heparin, [] SCDs, [] Ambulation,  [x] Eliquis, [] Xarelto  [] Coumadin   Code Status Full Code   Disposition From: home  Expected Disposition: home  Estimated Date of Discharge: 1-2 days  Patient requires continued admission due to afib-VR   Surrogate Decision Maker/ POA      Review of Systems:    Review of Systems    See above    Objective: Intake/Output Summary (Last 24 hours) at 10/26/2022 1221  Last data filed at 10/26/2022 1138  Gross per 24 hour   Intake 5070 ml   Output --   Net 5070 ml        Vitals:   Vitals:    10/26/22 1212   BP: (!) 96/55   Pulse: (!) 132   Resp:    Temp:    SpO2: 98%       Physical Exam:     General: NAD  Eyes: EOMI  ENT: neck supple  Cardiovascular: irregular rate and rhythm  Respiratory: bilateral rales  Gastrointestinal: Soft, non tender  Genitourinary: no suprapubic tenderness  Musculoskeletal: 1+ bilateral edema  Skin: warm, dry  Neuro: Alert. Psych: Mood appropriate.      Medications:   Medications:    furosemide  40 mg Oral Daily    insulin lispro  0-4 Units SubCUTAneous 2 times per day    glipiZIDE  10 mg Oral BID AC    dilTIAZem  60 mg Oral 4 times per day    apixaban  5 mg Oral BID    cilostazol  50 mg Oral BID    rosuvastatin  40 mg Oral Daily    budesonide-formoterol  2 puff Inhalation BID    insulin glargine  30 Units SubCUTAneous Daily    levothyroxine  200 mcg Oral Daily    [Held by provider] lisinopril  10 mg Oral Daily    sodium chloride flush  5-40 mL IntraVENous 2 times per day    insulin lispro  0-8 Units SubCUTAneous TID WC      Infusions:    dextrose      sodium chloride       PRN Meds: albuterol sulfate HFA, 2 puff, Q6H PRN  glucose, 4 tablet, PRN  dextrose bolus, 125 mL, PRN   Or  dextrose bolus, 250 mL, PRN  glucagon (rDNA), 1 mg, PRN  dextrose, , Continuous PRN  sodium chloride flush, 5-40 mL, PRN  sodium chloride, , PRN  ondansetron, 4 mg, Q8H PRN   Or  ondansetron, 4 mg, Q6H PRN  polyethylene glycol, 17 g, Daily PRN  acetaminophen, 650 mg, Q6H PRN   Or  acetaminophen, 650 mg, Q6H PRN      Labs      Recent Results (from the past 24 hour(s))   POCT Glucose    Collection Time: 10/25/22  4:59 PM   Result Value Ref Range    POC Glucose 194 (H) 70 - 99 MG/DL   POCT Glucose    Collection Time: 10/25/22  7:54 PM   Result Value Ref Range    POC Glucose 156 (H) 70 - 99 MG/DL   APTT    Collection Time: 10/26/22  1:22 AM   Result Value Ref Range    aPTT 35.1 25.1 - 37.1 SECONDS   CBC    Collection Time: 10/26/22  1:22 AM   Result Value Ref Range    WBC 7.9 4.0 - 10.5 K/CU MM    RBC 4.21 4.2 - 5.4 M/CU MM    Hemoglobin 12.5 12.5 - 16.0 GM/DL    Hematocrit 39.6 37 - 47 %    MCV 94.1 78 - 100 FL    MCH 29.7 27 - 31 PG    MCHC 31.6 (L) 32.0 - 36.0 %    RDW 14.8 11.7 - 14.9 %    Platelets 577 469 - 029 K/CU MM    MPV 11.0 7.5 - 11.1 FL   Brain Natriuretic Peptide    Collection Time: 10/26/22  1:22 AM   Result Value Ref Range    Pro-.8 (H) <300 PG/ML   POCT Glucose    Collection Time: 10/26/22  3:16 AM   Result Value Ref Range    POC Glucose 140 (H) 70 - 99 MG/DL   POCT Glucose    Collection Time: 10/26/22  6:05 AM   Result Value Ref Range    POC Glucose 149 (H) 70 - 99 MG/DL   APTT    Collection Time: 10/26/22  6:29 AM   Result Value Ref Range    aPTT 35.4 25.1 - 37.1 SECONDS   Magnesium    Collection Time: 10/26/22  8:58 AM   Result Value Ref Range    Magnesium 1.6 (L) 1.8 - 2.4 mg/dl   Basic Metabolic Panel w/ Reflex to MG    Collection Time: 10/26/22  8:58 AM   Result Value Ref Range    Sodium 136 135 - 145 MMOL/L    Potassium 3.8 3.5 - 5.1 MMOL/L    Chloride 96 (L) 99 - 110 mMol/L    CO2 29 21 - 32 MMOL/L    Anion Gap 11 4 - 16    BUN 10 6 - 23 MG/DL    Creatinine 0.7 0.6 - 1.1 MG/DL Est, Glom Filt Rate >60 >60 mL/min/1.73m2    Glucose 195 (H) 70 - 99 MG/DL    Calcium 9.2 8.3 - 10.6 MG/DL   POCT Glucose    Collection Time: 10/26/22 11:43 AM   Result Value Ref Range    POC Glucose 160 (H) 70 - 99 MG/DL        Imaging/Diagnostics Last 24 Hours   XR CHEST PORTABLE    Result Date: 10/25/2022  EXAMINATION: ONE XRAY VIEW OF THE CHEST 10/24/2022 3:26 pm COMPARISON: 2/10/2020 HISTORY: ORDERING SYSTEM PROVIDED HISTORY: sob TECHNOLOGIST PROVIDED HISTORY: Reason for exam:->sob Reason for Exam: sob FINDINGS: The heart size is enlarged. There is no pneumothorax, edema or focal consolidation. The bones are demineralized. Patient body habitus limits the exam.     Mild cardiac silhouette enlargement. No evidence of edema or pneumonia.        Electronically signed by Dolores Jacobsen MD on 10/26/2022 at 12:21 PM

## 2022-10-26 NOTE — PROGRESS NOTES
Cardiology Progress Note     Admit Date:  10/24/2022    Consult reason/ Seen today for :       Subjective and  Overnight Events : Patient currently feels okay she remains in atrial fibrillation with rapid ventricular rate. Past medical history:    has a past medical history of COPD (chronic obstructive pulmonary disease) ([facility] [address] H/O Doppler LOWER ARTERIAL ultrasound, H/O echocardiogram, Hypertension, PAD (peripheral artery disease) Periph Angio(HCC), Polycythemia, Respiratory failure, chronic ([address] and Type II or unspecified type diabetes mellitus without mention of complication, not stated as uncontrolled. Past surgical history:   has a past surgical history that includes Gallbladder surgery (1971). Social History:   reports that she quit smoking about 13 years ago. Her smoking use included cigarettes. She has a 0.13 pack-year smoking history. She has never used smokeless tobacco. She reports that she does not drink alcohol and does not use drugs. Family history:  family history includes Breast Cancer in her sister; Cancer in her paternal grandmother; Diabetes in her brother, father, mother, and sister; Heart Attack in her brother and father; Hypertension in her brother, father, mother, and sister. No Known Allergies    Review of Systems:    All 14 systems were reviewed and are negative  Except for the positive findings  which as documented       Physical Exam:  Vitals:    10/26/22 0900   BP:    Pulse: (!) 122   Resp: 23   Temp:    SpO2: 92%    BP (!) 96/55   Pulse (!) 122   Temp 97.9 °F (36.6 °C) (Oral)   Resp 23   Ht 5' 8\" (1.727 m)   Wt (!) 335 lb (152 kg)   SpO2 92%   BMI 50.94 kg/m²     General Appearance:  No distress, conversant  Constitutional:  No acute distress, Non-toxic appearance.     HENT:  Normocephalic, Atraumatic, Bilateral external ears normal, Oropharynx moist,   Eyes:  PERRL, EOMI, Conjunctiva normal, No discharge. Respiratory:  Normal breath sounds, No respiratory distress, No wheezing  Cardiovascular: S1, S2, no murmurs, gallops. JVD wnl  Abdomen /GI:  Bowel sounds normal, Soft, No tenderness   Genitourinary: No costovertebral angle tenderness   Musculoskeletal:  No edema, no tenderness, no deformities.    Integument:  Well hydrated, no rash   Neurologic:  Alert & oriented x 3, no focal deficits noted       Medications:    magnesium sulfate  2,000 mg IntraVENous Once    digoxin  500 mcg Oral Once    insulin lispro  0-4 Units SubCUTAneous 2 times per day    glipiZIDE  10 mg Oral BID AC    dilTIAZem  60 mg Oral 4 times per day    apixaban  5 mg Oral BID    furosemide  40 mg IntraVENous BID    cilostazol  50 mg Oral BID    rosuvastatin  40 mg Oral Daily    budesonide-formoterol  2 puff Inhalation BID    insulin glargine  30 Units SubCUTAneous Daily    levothyroxine  200 mcg Oral Daily    [Held by provider] lisinopril  10 mg Oral Daily    sodium chloride flush  5-40 mL IntraVENous 2 times per day    insulin lispro  0-8 Units SubCUTAneous TID WC      dextrose      sodium chloride       albuterol sulfate HFA, glucose, dextrose bolus **OR** dextrose bolus, glucagon (rDNA), dextrose, sodium chloride flush, sodium chloride, ondansetron **OR** ondansetron, polyethylene glycol, acetaminophen **OR** acetaminophen    Lab Data:  CBC:   Recent Labs     10/24/22  1705 10/26/22  0122   WBC 7.6 7.9   HGB 12.4* 12.5   HCT 41.3 39.6   MCV 95.6 94.1    172     BMP:   Recent Labs     10/24/22  1705 10/25/22  0754 10/26/22  0858    137 136   K 4.3 4.3 3.8    100 96*   CO2 26 25 29   BUN 10 11 10   CREATININE 0.8 0.8 0.7     PT/INR:   Recent Labs     10/25/22  0754   PROTIME 13.9   INR 1.08     BNP:    Recent Labs     10/26/22  0122   PROBNP 612.8*     TROPONIN:   Recent Labs     10/24/22  1705 10/24/22  2216 10/25/22  0754   TROPONINT <0.010 <0.010 <0.010      Assessment and Recommendations:    Atrial fibrillation with rapid ventricular response  Chronic diastolic heart failure  Chronic peripheral vascular disease        67 y. o.year old with above medical history. She is hemodynamically stable. She remains tachycardic on oral diltiazem. There is concern about hypotension as well. We will therefore add digoxin today. We will give her 1 dose of 0.5 mg followed by 0.25 mg daily. I will switch her Lasix to 40 mg p.o. daily. Continue aspirin and cilostazol for peripheral vascular disease. Thank you  much for consult and giving us the opportunity in contributing in the care of this patient. Please feel free to call me for any questions. 67 y. o.year old who is admitted with      All labs, medications and tests reviewed by myself , continue all other medications of all above medical condition listed as is except for changes mentioned above. Thank you very much for consult , please call with questions.     Sanjay Yeung MD, MD 10/26/2022 10:46 AM

## 2022-10-27 NOTE — PROGRESS NOTES
V2.0  [facility] [place] Hospitalist Progress Note      Name:  Haleigh Gotti /Age/Sex: 1945  (67 y.o. female)   MRN & CSN:  [id number] & [id number] Encounter Date/Time: 10/27/2022 2:29 PM EDT    Location:  -A PCP: Sara Dorsey MD       Hospital Day: 4      Subjective:     Chief Complaint: Irregular Heart Beat (Atrial fib)     Patient still tachy 110-115's soft BP. Cardiology changed meds to amiodarone discontinued diltiazem. Pt has no new complaints or concerns. Denies lightheadedness, dizziness, fever, night sweats, chills, chest pain, cough, dyspnea, palpitations, abd pain, nausea, vomiting, diarrhea, dysuria. Assessment and Plan:   Haleigh Gotti is a 67 y.o. female with pmh of COPD (2L), CHF, DM II, PVD, PRISCA who presents with Atrial fibrillation with RVR ([address]      Plan:  #Atrial fibrillation with rapid ventricular response  Still tachy 110-115's on dilt PO, and digoxin. Likely in the setting of volume overload and history of COPD and PRISCA. High ChadsVascore 5.  -per cardiology, meds chnaged to amiodarone and to continue with digoxin  - continue Eliquis by cardiolgy  - may go for SAMMY and cardioversion  - follow up cardiology recs  -Telemetry monitoring      #suspected acute on chronic diastolic heart failure - improving. ECHO 10/24/2022 EF 55-60%, moderately dilated RV. CXR clear.  -continue Lasix PO  -Intake and output monitoring-Daily weights     #Hypomagnesemia  Replace as needed        #Diabetes Mellitus type II-  On home inslulin and oral meds. HA1C 5.8 10/24/2022.   - SSI  - endo following      #Chronic respiratory failure COPD. On 2L NC at home. Not in exacerbation.  -Continue home oxygen  -Continue home MDI  -Continuous pulse ox     #Peripheral vascular disease: Hx of left common iliac and left common femoral stenosis per angiogram in .   She was to follow-up to have a CSI arthrectomy but failed to follow-up for procedure as COVID-19 shutdown any procedures and was canceled. -Continue Pletal   - outpatient follow up    #Essential Hypertension- BP in the soft side given dilt PO.  - hold home lisinopril for now      Diet ADULT DIET; Regular; 4 carb choices (60 gm/meal); Low Sodium (2 gm); 2000 ml  Diet NPO   DVT Prophylaxis [] Lovenox, []  Heparin, [] SCDs, [] Ambulation,  [x] Eliquis, [] Xarelto  [] Coumadin   Code Status Full Code   Disposition From: home  Expected Disposition: home  Estimated Date of Discharge: 1-2 days  Patient requires continued admission due to afib-VR   Surrogate Decision Maker/ POA      Review of Systems:    Review of Systems    See above    Objective: Intake/Output Summary (Last 24 hours) at 10/27/2022 1245  Last data filed at 10/26/2022 1610  Gross per 24 hour   Intake 120 ml   Output --   Net 120 ml        Vitals:   Vitals:    10/27/22 1230   BP: (!) 103/56   Pulse:    Resp:    Temp:    SpO2:        Physical Exam:     General: NAD  Eyes: EOMI  ENT: neck supple  Cardiovascular: irregular rate and rhythm  Respiratory: bilateral rales  Gastrointestinal: Soft, non tender  Genitourinary: no suprapubic tenderness  Musculoskeletal: 1+ bilateral edema  Skin: warm, dry  Neuro: Alert. Psych: Mood appropriate.      Medications:   Medications:    amiodarone  400 mg Oral BID    furosemide  40 mg Oral Daily    magnesium oxide  400 mg Oral Daily    digoxin  125 mcg Oral Daily    insulin lispro  0-4 Units SubCUTAneous 2 times per day    glipiZIDE  10 mg Oral BID AC    dilTIAZem  60 mg Oral 4 times per day    apixaban  5 mg Oral BID    cilostazol  50 mg Oral BID    rosuvastatin  40 mg Oral Daily    budesonide-formoterol  2 puff Inhalation BID    insulin glargine  30 Units SubCUTAneous Daily    levothyroxine  200 mcg Oral Daily    [Held by provider] lisinopril  10 mg Oral Daily    sodium chloride flush  5-40 mL IntraVENous 2 times per day    insulin lispro  0-8 Units SubCUTAneous TID WC      Infusions:    dextrose      sodium chloride       PRN Meds: albuterol sulfate HFA, 2 puff, Q6H PRN  glucose, 4 tablet, PRN  dextrose bolus, 125 mL, PRN   Or  dextrose bolus, 250 mL, PRN  glucagon (rDNA), 1 mg, PRN  dextrose, , Continuous PRN  sodium chloride flush, 5-40 mL, PRN  sodium chloride, , PRN  ondansetron, 4 mg, Q8H PRN   Or  ondansetron, 4 mg, Q6H PRN  polyethylene glycol, 17 g, Daily PRN  acetaminophen, 650 mg, Q6H PRN   Or  acetaminophen, 650 mg, Q6H PRN      Labs      Recent Results (from the past 24 hour(s))   POCT Glucose    Collection Time: 10/26/22  4:10 PM   Result Value Ref Range    POC Glucose 161 (H) 70 - 99 MG/DL   POCT Glucose    Collection Time: 10/26/22  8:05 PM   Result Value Ref Range    POC Glucose 190 (H) 70 - 99 MG/DL   POCT Glucose    Collection Time: 10/27/22  2:49 AM   Result Value Ref Range    POC Glucose 184 (H) 70 - 99 MG/DL   POCT Glucose    Collection Time: 10/27/22  6:26 AM   Result Value Ref Range    POC Glucose 142 (H) 70 - 99 MG/DL   POCT Glucose    Collection Time: 10/27/22  7:45 AM   Result Value Ref Range    POC Glucose 198 (H) 70 - 99 MG/DL   POCT Glucose    Collection Time: 10/27/22 10:44 AM   Result Value Ref Range    POC Glucose 239 (H) 70 - 99 MG/DL        Imaging/Diagnostics Last 24 Hours   XR CHEST PORTABLE    Result Date: 10/25/2022  EXAMINATION: ONE XRAY VIEW OF THE CHEST 10/24/2022 3:26 pm COMPARISON: 2/10/2020 HISTORY: ORDERING SYSTEM PROVIDED HISTORY: sob TECHNOLOGIST PROVIDED HISTORY: Reason for exam:->sob Reason for Exam: sob FINDINGS: The heart size is enlarged. There is no pneumothorax, edema or focal consolidation. The bones are demineralized. Patient body habitus limits the exam.     Mild cardiac silhouette enlargement. No evidence of edema or pneumonia.        Electronically signed by Anna Livingston MD on 10/27/2022 at 12:45 PM

## 2022-10-27 NOTE — PROGRESS NOTES
Cardiology Progress Note     Admit Date:  10/24/2022    Consult reason/ Seen today for :       Subjective and  Overnight Events : Patient currently feels okay she remains in atrial fibrillation with uncontrolled ventricular rate      Past medical history:    has a past medical history of COPD (chronic obstructive pulmonary disease) ([facility] [address] H/O Doppler LOWER ARTERIAL ultrasound, H/O echocardiogram, Hypertension, PAD (peripheral artery disease) Periph Angio(HCC), Polycythemia, Respiratory failure, chronic ([address] and Type II or unspecified type diabetes mellitus without mention of complication, not stated as uncontrolled. Past surgical history:   has a past surgical history that includes Gallbladder surgery (1971). Social History:   reports that she quit smoking about 13 years ago. Her smoking use included cigarettes. She has a 0.13 pack-year smoking history. She has never used smokeless tobacco. She reports that she does not drink alcohol and does not use drugs. Family history:  family history includes Breast Cancer in her sister; Cancer in her paternal grandmother; Diabetes in her brother, father, mother, and sister; Heart Attack in her brother and father; Hypertension in her brother, father, mother, and sister. No Known Allergies    Review of Systems:    All 14 systems were reviewed and are negative  Except for the positive findings  which as documented       Physical Exam:  Vitals:    10/27/22 0750   BP: (!) 111/59   Pulse: (!) 111   Resp: 18   Temp: 98.1 °F (36.7 °C)   SpO2:     BP (!) 111/59   Pulse (!) 111   Temp 98.1 °F (36.7 °C) (Oral)   Resp 18   Ht 5' 8\" (1.727 m)   Wt (!) 335 lb 1.6 oz (152 kg)   SpO2 100%   BMI 50.95 kg/m²     General Appearance:  No distress, conversant  Constitutional:  No acute distress, Non-toxic appearance.     HENT:  Normocephalic, Atraumatic, Bilateral external ears normal, Oropharynx moist, Eyes:  PERRL, EOMI, Conjunctiva normal, No discharge. Respiratory:  Normal breath sounds, No respiratory distress, No wheezing  Cardiovascular: Irregular pulse S1, S2, no murmurs, gallops. JVD wnl  Abdomen /GI:  Bowel sounds normal, Soft, No tenderness   Genitourinary: No costovertebral angle tenderness   Musculoskeletal:  No edema, no tenderness, no deformities.    Integument:  Well hydrated, no rash   Neurologic:  Alert & oriented x 3, no focal deficits noted       Medications:    amiodarone  400 mg Oral BID    furosemide  40 mg Oral Daily    magnesium oxide  400 mg Oral Daily    digoxin  125 mcg Oral Daily    insulin lispro  0-4 Units SubCUTAneous 2 times per day    glipiZIDE  10 mg Oral BID AC    dilTIAZem  60 mg Oral 4 times per day    apixaban  5 mg Oral BID    cilostazol  50 mg Oral BID    rosuvastatin  40 mg Oral Daily    budesonide-formoterol  2 puff Inhalation BID    insulin glargine  30 Units SubCUTAneous Daily    levothyroxine  200 mcg Oral Daily    [Held by provider] lisinopril  10 mg Oral Daily    sodium chloride flush  5-40 mL IntraVENous 2 times per day    insulin lispro  0-8 Units SubCUTAneous TID WC      dextrose      sodium chloride       albuterol sulfate HFA, glucose, dextrose bolus **OR** dextrose bolus, glucagon (rDNA), dextrose, sodium chloride flush, sodium chloride, ondansetron **OR** ondansetron, polyethylene glycol, acetaminophen **OR** acetaminophen    Lab Data:  CBC:   Recent Labs     10/24/22  1705 10/26/22  0122   WBC 7.6 7.9   HGB 12.4* 12.5   HCT 41.3 39.6   MCV 95.6 94.1    172     BMP:   Recent Labs     10/24/22  1705 10/25/22  0754 10/26/22  0858    137 136   K 4.3 4.3 3.8    100 96*   CO2 26 25 29   BUN 10 11 10   CREATININE 0.8 0.8 0.7     PT/INR:   Recent Labs     10/25/22  0754   PROTIME 13.9   INR 1.08     BNP:    Recent Labs     10/26/22  0122   PROBNP 612.8*     TROPONIN:   Recent Labs     10/24/22  1705 10/24/22  2216 10/25/22  0754   TROPONINT <0.010 <0.010 <0.010      Assessment and Recommendations:    Atrial fibrillation with rapid ventricular response  Chronic diastolic heart failure  Chronic peripheral vascular disease        67 y. o.year old with above medical history. She is hemodynamically stable. Her heart rate remains uncontrolled. Her blood pressure is in 90s mostly. We will therefore start her on amiodarone along with digoxin and stop oral diltiazem. Continue with Lasix 40 mg p.o. daily. If the heart rate remains uncontrolled we may require SAMMY guided cardioversion. We will keep n.p.o. after midnight for tomorrow. Continue aspirin and cilostazol for peripheral vascular disease. Thank you  much for consult and giving us the opportunity in contributing in the care of this patient. Please feel free to call me for any questions. 67 y. o.year old who is admitted with      All labs, medications and tests reviewed by myself , continue all other medications of all above medical condition listed as is except for changes mentioned above. Thank you very much for consult , please call with questions.     Malachi Robertson MD, MD 10/27/2022 11:22 AM

## 2022-10-27 NOTE — PROGRESS NOTES
Progress Note( Dr. Carroll Lopez)  10/26/2022  Subjective:   Admit Date: 10/24/2022  PCP: Dneny Pearson MD    Admitted For : Atrial fibrillation  with rapid ventricular respons was noted by cardiologist on patient's office visit to him    Consulted For: Better control of blood glucose    Interval History: Patient feels lot better this morning he has atrial fibrillation but rate seem to be as slower    Denies any chest pains,   Denies SOB . Denies nausea or vomiting. No new bowel or bladder symptoms. Intake/Output Summary (Last 24 hours) at 10/26/2022 2156  Last data filed at 10/26/2022 1610  Gross per 24 hour   Intake 1190 ml   Output --   Net 1190 ml       DATA    CBC:   Recent Labs     10/24/22  1705 10/26/22  0122   WBC 7.6 7.9   HGB 12.4* 12.5    172    CMP:  Recent Labs     10/24/22  1705 10/25/22  0754 10/26/22  0858    137 136   K 4.3 4.3 3.8    100 96*   CO2 26 25 29   BUN 10 11 10   CREATININE 0.8 0.8 0.7   CALCIUM 9.5 9.4 9.2   PROT 6.9  --   --    LABALBU 4.1  --   --    BILITOT 0.7  --   --    ALKPHOS 46  --   --    AST 15  --   --    ALT 10  --   --      Lipids:   Lab Results   Component Value Date/Time    CHOL 111 10/22/2013 09:59 AM    HDL 39 10/22/2013 09:59 AM    TRIG 183 10/22/2013 09:59 AM     Glucose:  Recent Labs     10/26/22  1143 10/26/22  1610 10/26/22  2005   POCGLU 160* 161* 190*     [id number]:  Lab Results   Component Value Date/Time    LABA1C 5.8 10/24/2022 05:05 PM     High Sensitivity TSH:   Lab Results   Component Value Date/Time    TSHHS 0.793 10/24/2022 10:16 PM     Free T3: No results found for: FT3  Free T4:  Lab Results   Component Value Date/Time    T4FREE 1.75 10/24/2022 10:16 PM       XR CHEST PORTABLE   Final Result   Mild cardiac silhouette enlargement. No evidence of edema or pneumonia.               Scheduled Medicines   Medications:    furosemide  40 mg Oral Daily    magnesium oxide  400 mg Oral Daily    [START ON 10/27/2022] digoxin  125 mcg Oral Daily    insulin lispro  0-4 Units SubCUTAneous 2 times per day    glipiZIDE  10 mg Oral BID AC    dilTIAZem  60 mg Oral 4 times per day    apixaban  5 mg Oral BID    cilostazol  50 mg Oral BID    rosuvastatin  40 mg Oral Daily    budesonide-formoterol  2 puff Inhalation BID    insulin glargine  30 Units SubCUTAneous Daily    levothyroxine  200 mcg Oral Daily    [Held by provider] lisinopril  10 mg Oral Daily    sodium chloride flush  5-40 mL IntraVENous 2 times per day    insulin lispro  0-8 Units SubCUTAneous TID WC      Infusions:    dextrose      sodium chloride           Objective:   Vitals: /63   Pulse (!) 115   Temp 98.2 °F (36.8 °C) (Oral)   Resp 25   Ht 5' 8\" (1.727 m)   Wt (!) 335 lb (152 kg)   SpO2 96%   BMI 50.94 kg/m²   General appearance: alert and cooperative with exam  Neck: no JVD or bruit  Thyroid : Normal lobes   Lungs: Has Vesicular Breath sounds home oxygen 24/7  Heart:  Atrial fibrillation   Abdomen: soft, non-tender; bowel sounds normal; no masses,  no organomegaly  Musculoskeletal: Normal  Extremities: extremities normal, , no edema  Neurologic:  Awake, alert, oriented to name, place and time. Cranial nerves II-XII are grossly intact. Motor is  intact. Sensory i neuropathy. ,  and gait is normal.    Assessment:     Patient Active Problem List:     Chronic respiratory failure (HCC)     COPD, mild (HCC)     PAD (peripheral artery disease) (HCC)     SOB (shortness of breath)     Mixed hyperlipidemia     New onset a-fib ([facility] [address]     Class 3 severe obesity due to excess calories with serious comorbidity and body mass index (BMI) of 50.0 to 59.9 in adult [facility])     Primary hypertension     Atrial fibrillation with RVR ([facility] [address]      Plan:     Reviewed POC blood glucose .  Labs and X ray results   Reviewed Current Medicines   On meal/ Correction bolus Humalog/ Basal Lantus Insulin regime / and Oral Hypoglycemic drugs   Monitor Blood glucose frequently   Modified  the dose of Insulin/ other medicines as needed   [facility] cardiology and hospitalist  Will follow     .      Marylene Scrape, MD, MD

## 2022-10-27 NOTE — PROGRESS NOTES
Patient with new onset A. fib RVR. Explained to patient need for SAMMY cardioversion. Described the procedure in great detail as well as risk and benefits. Patient and family had many questions, all of which were answered. Patient voiced understanding of the procedure itself and is agreeable to undergoing the SAMMY cardioversion.     Consent obtained    Livingston Saint, PA-C 10/27/22 3:18 PM

## 2022-10-28 NOTE — PROGRESS NOTES
V2.0  [facility] Hospitalist Progress Note      Name:  Verna Matute /Age/Sex: 1945  (67 y.o. female)   MRN & CSN:  [id number] & [id number] Encounter Date/Time: 10/28/2022 2:29 PM EDT    Location:  -A PCP: Jania Davenport MD       Hospital Day: 5      Subjective:     Chief Complaint: Irregular Heart Beat (Atrial fib)     Patient still tachy 100's but better. Cardiology started Toprol. SAMMY w cardioversion cancelled; plan for pt to be 3 weeks on Eliquis and to do cardioversion outpatient. BP stable 100's. Pt has no new complaints or concerns. Denies lightheadedness, dizziness, fever, night sweats, chills, chest pain, cough, dyspnea, palpitations, abd pain, nausea, vomiting, diarrhea, dysuria. Assessment and Plan:   Verna Matute is a 67 y.o. female with pmh of COPD (2L), CHF, DM II, PVD, PRISCA who presents with Atrial fibrillation with RVR ([address]      Plan:  #Atrial fibrillation with rapid ventricular response - improved  Still tachy 's. Likely in the setting of volume overload and history of COPD and PRISCA. High ChadsVascore 5.  -per cardiology, start Toprol, and to continue with digoxin and amidarone  - continue Eliquis by cardiolgy  - follow up cardiology recs  -Telemetry monitoring  - plan for cardioversion in 3 weeks    #suspected acute on chronic diastolic heart failure - improving. ECHO 10/24/2022 EF 55-60%, moderately dilated RV. CXR clear.  -diuretics changed to Torsemide 40 mg by cardiology  -Intake and output monitoring-Daily weights     #Hypomagnesemia  Replace as needed        #Diabetes Mellitus type II-  On home inslulin and oral meds. HA1C 5.8 10/24/2022.   - SSI  - endo following      #Chronic respiratory failure COPD. On 2L NC at home. Not in exacerbation.  -Continue home oxygen  -Continue home MDI  -Continuous pulse ox     #Peripheral vascular disease: Hx of left common iliac and left common femoral stenosis per angiogram in .   She was to follow-up to have a CSI arthrectomy but failed to follow-up for procedure as COVID-19 shutdown any procedures and was canceled. -Continue Pletal   - outpatient follow up    #Essential Hypertension- BP in the soft side. - hold home lisinopril for now      Diet ADULT DIET; Regular; 4 carb choices (60 gm/meal); Low Sodium (2 gm); 2000 ml   DVT Prophylaxis [] Lovenox, []  Heparin, [] SCDs, [] Ambulation,  [x] Eliquis, [] Xarelto  [] Coumadin   Code Status Full Code   Disposition From: home  Expected Disposition: home  Estimated Date of Discharge: 1-2 days  Patient requires continued admission due to afib-VR   Surrogate Decision Maker/ POA      Review of Systems:    Review of Systems    See above    Objective:   No intake or output data in the 24 hours ending 10/28/22 1120       Vitals:   Vitals:    10/28/22 1100   BP: (!) 95/56   Pulse: (!) 106   Resp: 29   Temp:    SpO2: 94%       Physical Exam:     General: NAD  Eyes: EOMI  ENT: neck supple  Cardiovascular: irregular rate and rhythm  Respiratory: bilateral mild basilar rales  Gastrointestinal: Soft, non tender  Genitourinary: no suprapubic tenderness  Musculoskeletal: 1+ bilateral edema  Skin: warm, dry  Neuro: Alert. Psych: Mood appropriate.      Medications:   Medications:    insulin lispro  0-8 Units SubCUTAneous 2 times per day    torsemide  40 mg Oral Daily    metoprolol succinate  50 mg Oral Daily    amiodarone  400 mg Oral BID    magnesium oxide  400 mg Oral Daily    digoxin  125 mcg Oral Daily    glipiZIDE  10 mg Oral BID AC    apixaban  5 mg Oral BID    cilostazol  50 mg Oral BID    rosuvastatin  40 mg Oral Daily    budesonide-formoterol  2 puff Inhalation BID    insulin glargine  30 Units SubCUTAneous Daily    levothyroxine  200 mcg Oral Daily    [Held by provider] lisinopril  10 mg Oral Daily    sodium chloride flush  5-40 mL IntraVENous 2 times per day    insulin lispro  0-8 Units SubCUTAneous TID WC      Infusions:    dextrose      sodium chloride       PRN Meds: albuterol sulfate HFA, 2 puff, Q6H PRN  glucose, 4 tablet, PRN  dextrose bolus, 125 mL, PRN   Or  dextrose bolus, 250 mL, PRN  glucagon (rDNA), 1 mg, PRN  dextrose, , Continuous PRN  sodium chloride flush, 5-40 mL, PRN  sodium chloride, , PRN  ondansetron, 4 mg, Q8H PRN   Or  ondansetron, 4 mg, Q6H PRN  polyethylene glycol, 17 g, Daily PRN  acetaminophen, 650 mg, Q6H PRN   Or  acetaminophen, 650 mg, Q6H PRN      Labs      Recent Results (from the past 24 hour(s))   CBC with Auto Differential    Collection Time: 10/27/22  7:43 PM   Result Value Ref Range    WBC 8.7 4.0 - 10.5 K/CU MM    RBC 4.35 4.2 - 5.4 M/CU MM    Hemoglobin 12.6 12.5 - 16.0 GM/DL    Hematocrit 40.3 37 - 47 %    MCV 92.6 78 - 100 FL    MCH 29.0 27 - 31 PG    MCHC 31.3 (L) 32.0 - 36.0 %    RDW 14.6 11.7 - 14.9 %    Platelets 224 263 - 956 K/CU MM    MPV 10.7 7.5 - 11.1 FL    Differential Type AUTOMATED DIFFERENTIAL     Segs Relative 70.1 (H) 36 - 66 %    Lymphocytes % 18.9 (L) 24 - 44 %    Monocytes % 9.4 (H) 0 - 4 %    Eosinophils % 1.0 0 - 3 %    Basophils % 0.3 0 - 1 %    Segs Absolute 6.1 K/CU MM    Lymphocytes Absolute 1.6 K/CU MM    Monocytes Absolute 0.8 K/CU MM    Eosinophils Absolute 0.1 K/CU MM    Basophils Absolute 0.0 K/CU MM    Nucleated RBC % 0.0 %    Total Nucleated RBC 0.0 K/CU MM    Total Immature Neutrophil 0.03 K/CU MM    Immature Neutrophil % 0.3 0 - 0.43 %   Basic Metabolic Panel    Collection Time: 10/27/22  7:43 PM   Result Value Ref Range    Sodium 134 (L) 135 - 145 MMOL/L    Potassium 3.6 3.5 - 5.1 MMOL/L    Chloride 91 (L) 99 - 110 mMol/L    CO2 32 21 - 32 MMOL/L    Anion Gap 11 4 - 16    BUN 11 6 - 23 MG/DL    Creatinine 0.7 0.6 - 1.1 MG/DL    Est, Glom Filt Rate >60 >60 mL/min/1.73m2    Glucose 209 (H) 70 - 99 MG/DL    Calcium 9.4 8.3 - 10.6 MG/DL   POCT Glucose    Collection Time: 10/27/22  8:26 PM   Result Value Ref Range    POC Glucose 229 (H) 70 - 99 MG/DL   POCT Glucose    Collection Time: 10/28/22  2:03 AM   Result Value Ref Range    POC Glucose 189 (H) 70 - 99 MG/DL   CBC with Auto Differential    Collection Time: 10/28/22  4:13 AM   Result Value Ref Range    WBC 7.3 4.0 - 10.5 K/CU MM    RBC 4.28 4.2 - 5.4 M/CU MM    Hemoglobin 12.6 12.5 - 16.0 GM/DL    Hematocrit 40.2 37 - 47 %    MCV 93.9 78 - 100 FL    MCH 29.4 27 - 31 PG    MCHC 31.3 (L) 32.0 - 36.0 %    RDW 14.6 11.7 - 14.9 %    Platelets 309 330 - 513 K/CU MM    MPV 10.8 7.5 - 11.1 FL    Differential Type AUTOMATED DIFFERENTIAL     Segs Relative 68.0 (H) 36 - 66 %    Lymphocytes % 18.4 (L) 24 - 44 %    Monocytes % 11.2 (H) 0 - 4 %    Eosinophils % 1.5 0 - 3 %    Basophils % 0.5 0 - 1 %    Segs Absolute 5.0 K/CU MM    Lymphocytes Absolute 1.4 K/CU MM    Monocytes Absolute 0.8 K/CU MM    Eosinophils Absolute 0.1 K/CU MM    Basophils Absolute 0.0 K/CU MM    Nucleated RBC % 0.0 %    Total Nucleated RBC 0.0 K/CU MM    Total Immature Neutrophil 0.03 K/CU MM    Immature Neutrophil % 0.4 0 - 0.43 %   Basic Metabolic Panel    Collection Time: 10/28/22  4:13 AM   Result Value Ref Range    Sodium 136 135 - 145 MMOL/L    Potassium 3.3 (L) 3.5 - 5.1 MMOL/L    Chloride 93 (L) 99 - 110 mMol/L    CO2 34 (H) 21 - 32 MMOL/L    Anion Gap 9 4 - 16    BUN 10 6 - 23 MG/DL    Creatinine 0.7 0.6 - 1.1 MG/DL    Est, Glom Filt Rate >60 >60 mL/min/1.73m2    Glucose 185 (H) 70 - 99 MG/DL    Calcium 9.2 8.3 - 10.6 MG/DL   POCT Glucose    Collection Time: 10/28/22  5:57 AM   Result Value Ref Range    POC Glucose 175 (H) 70 - 99 MG/DL   POCT Glucose    Collection Time: 10/28/22  8:52 AM   Result Value Ref Range    POC Glucose 159 (H) 70 - 99 MG/DL        Imaging/Diagnostics Last 24 Hours   XR CHEST PORTABLE    Result Date: 10/25/2022  EXAMINATION: ONE XRAY VIEW OF THE CHEST 10/24/2022 3:26 pm COMPARISON: 2/10/2020 HISTORY: ORDERING SYSTEM PROVIDED HISTORY: sob TECHNOLOGIST PROVIDED HISTORY: Reason for exam:->sob Reason for Exam: sob FINDINGS: The heart size is enlarged.   There is no pneumothorax, edema or focal consolidation. The bones are demineralized. Patient body habitus limits the exam.     Mild cardiac silhouette enlargement. No evidence of edema or pneumonia.        Electronically signed by Bijal Lino MD on 10/28/2022 at 11:20 AM

## 2022-10-28 NOTE — PROGRESS NOTES
Met with patient and daughter. Introduced myself as the Heart failure education R.N. Admitting diagnosis- Atrial fibrillation with RVR   Cardiologist- Mellisa  Ejection fraction  55-60% as of 10/24    Pro BNP- 612 on 10/26  [facility] Patient informed of appointment and appointment added to AVS    PCP- [facility]   Patient has a digital scale? Yes   Transportation- has transportation    Able to obtain medications without difficulty? - Yes- Patient denies difficulty in obtaining or taking medications. Advised not to stop taking a medication without notifying provider. Heart failure specific Medications-  Lisinopril,  Metoprolol, Lasix, Potassium, Eliquis, Lanoxin, Cordarone. Reviewed Heart failure patient education book with patient. Questions answered. Patient's heart failure medications reviewed and information given on each. Reviewed the Stop Light Handout with patient and instructed when to call her provider. Patient was engaged and attentive during education session. The following handouts were reviewed with patient and patient was given a copy of the following : Heart Failure education booklet, the [employer] handout, the 'Stop Light' Handout, Staying Motivated- Finding Your Why, a link to the [address] with Heart Failure Interactive workbook and a list of heart failure related education available on the hospital TV and how to access it.

## 2022-10-28 NOTE — PROGRESS NOTES
Progress Note( Dr. Yusef Martin)  10/28/2022  Subjective:   Admit Date: 10/24/2022  PCP: Olinda Rincon MD    Admitted For : Atrial fibrillation  with rapid ventricular respons was noted by cardiologist on patient's office visit to him    Consulted For: Better control of blood glucose    Interval History: Patient feels lot better this morning he has atrial fibrillation but rate seem to be as slower  Cardiology decided against cardioversion this time  Stated we will try with high-dose amiodarone , metoprolol and digoxin and discontinue Cardizem    Denies any chest pains,   Denies SOB . Denies nausea or vomiting. No new bowel or bladder symptoms. No intake or output data in the 24 hours ending 10/28/22 1859      DATA    CBC:   Recent Labs     10/26/22  0122 10/27/22  1943 10/28/22  0413   WBC 7.9 8.7 7.3   HGB 12.5 12.6 12.6    157 146      CMP:  Recent Labs     10/26/22  0858 10/27/22  1943 10/28/22  0413    134* 136   K 3.8 3.6 3.3*   CL 96* 91* 93*   CO2 29 32 34*   BUN 10 11 10   CREATININE 0.7 0.7 0.7   CALCIUM 9.2 9.4 9.2       Lipids:   Lab Results   Component Value Date/Time    CHOL 111 10/22/2013 09:59 AM    HDL 39 10/22/2013 09:59 AM    TRIG 183 10/22/2013 09:59 AM     Glucose:  Recent Labs     10/28/22  0852 10/28/22  1118 10/28/22  1601   POCGLU 159* 175* 220*       [id number]:  Lab Results   Component Value Date/Time    LABA1C 5.8 10/24/2022 05:05 PM     High Sensitivity TSH:   Lab Results   Component Value Date/Time    TSHHS 0.793 10/24/2022 10:16 PM     Free T3: No results found for: FT3  Free T4:  Lab Results   Component Value Date/Time    T4FREE 1.75 10/24/2022 10:16 PM       XR CHEST PORTABLE   Final Result   Mild cardiac silhouette enlargement. No evidence of edema or pneumonia.               Scheduled Medicines   Medications:    insulin lispro  0-8 Units SubCUTAneous 2 times per day    torsemide  40 mg Oral Daily    metoprolol succinate  50 mg Oral Daily    amiodarone  400 mg Oral BID    magnesium oxide  400 mg Oral Daily    digoxin  125 mcg Oral Daily    glipiZIDE  10 mg Oral BID AC    apixaban  5 mg Oral BID    cilostazol  50 mg Oral BID    rosuvastatin  40 mg Oral Daily    budesonide-formoterol  2 puff Inhalation BID    insulin glargine  30 Units SubCUTAneous Daily    levothyroxine  200 mcg Oral Daily    [Held by provider] lisinopril  10 mg Oral Daily    sodium chloride flush  5-40 mL IntraVENous 2 times per day    insulin lispro  0-8 Units SubCUTAneous TID WC      Infusions:    dextrose      sodium chloride           Objective:   Vitals: /80   Pulse (!) 110   Temp 97.9 °F (36.6 °C) (Oral)   Resp 21   Ht 5' 8\" (1.727 m)   Wt (!) 335 lb 1.6 oz (152 kg)   SpO2 94%   BMI 50.95 kg/m²   General appearance: alert and cooperative with exam  Neck: no JVD or bruit  Thyroid : Normal lobes   Lungs: Has Vesicular Breath sounds home oxygen 24/7  Heart:  Atrial fibrillation   Abdomen: soft, non-tender; bowel sounds normal; no masses,  no organomegaly  Musculoskeletal: Normal  Extremities: extremities normal, , no edema  Neurologic:  Awake, alert, oriented to name, place and time. Cranial nerves II-XII are grossly intact. Motor is  intact. Sensory i neuropathy. ,  and gait is normal.    Assessment:     Patient Active Problem List:     Chronic respiratory failure (HCC)     COPD, mild (HCC)     PAD (peripheral artery disease) (HCC)     SOB (shortness of breath)     Mixed hyperlipidemia     New onset a-fib ([facility] [address]     Class 3 severe obesity due to excess calories with serious comorbidity and body mass index (BMI) of 50.0 to 59.9 in adult [facility])     Primary hypertension     Atrial fibrillation with RVR ([facility] [address]      Plan:     Reviewed POC blood glucose .  Labs and X ray results   Reviewed Current Medicines   On meal/ Correction bolus Humalog/ Basal Lantus Insulin regime / and Oral Hypoglycemic drugs   Monitor Blood glucose frequently   Modified  the dose of Insulin/ other medicines as needed Reviewed notes of cardiology and hospitalist  Will follow     .      Timmothy Schlatter, MD, MD

## 2022-10-28 NOTE — PROGRESS NOTES
Cardiology Progress Note     Admit Date:  10/24/2022    Consult reason/ Seen today for :       Subjective and  Overnight Events : Patient currently feels okay she remains in atrial fibrillation. Her heart rate is hovering around 100 bpm    Past medical history:    has a past medical history of COPD (chronic obstructive pulmonary disease) ([facility] [address] H/O Doppler LOWER ARTERIAL ultrasound, H/O echocardiogram, Hypertension, PAD (peripheral artery disease) Periph Angio(HCC), Polycythemia, Respiratory failure, chronic ([address] and Type II or unspecified type diabetes mellitus without mention of complication, not stated as uncontrolled. Past surgical history:   has a past surgical history that includes Gallbladder surgery (1971). Social History:   reports that she quit smoking about 13 years ago. Her smoking use included cigarettes. She has a 0.13 pack-year smoking history. She has never used smokeless tobacco. She reports that she does not drink alcohol and does not use drugs. Family history:  family history includes Breast Cancer in her sister; Cancer in her paternal grandmother; Diabetes in her brother, father, mother, and sister; Heart Attack in her brother and father; Hypertension in her brother, father, mother, and sister. No Known Allergies    Review of Systems:    All 14 systems were reviewed and are negative  Except for the positive findings  which as documented       Physical Exam:  Vitals:    10/28/22 0900   BP:    Pulse: 99   Resp:    Temp:    SpO2:     /65   Pulse 99   Temp 97.9 °F (36.6 °C) (Oral)   Resp 21   Ht 5' 8\" (1.727 m)   Wt (!) 335 lb 1.6 oz (152 kg)   SpO2 100%   BMI 50.95 kg/m²     General Appearance:  No distress, conversant  Constitutional:  No acute distress, Non-toxic appearance.     HENT:  Normocephalic, Atraumatic, Bilateral external ears normal, Oropharynx moist,   Eyes:  PERRL, EOMI, Conjunctiva normal, No discharge. Respiratory:  Normal breath sounds, No respiratory distress, No wheezing  Cardiovascular: Irregular pulse S1, S2, no murmurs, gallops. JVD wnl  Abdomen /GI:  Bowel sounds normal, Soft, No tenderness   Genitourinary: No costovertebral angle tenderness   Musculoskeletal: 2+ edema  Integument:  Well hydrated, no rash   Neurologic:  Alert & oriented x 3, no focal deficits noted       Medications:    insulin lispro  0-8 Units SubCUTAneous 2 times per day    torsemide  40 mg Oral Daily    potassium chloride  40 mEq Oral Once    metoprolol succinate  50 mg Oral Daily    amiodarone  400 mg Oral BID    magnesium oxide  400 mg Oral Daily    digoxin  125 mcg Oral Daily    glipiZIDE  10 mg Oral BID AC    apixaban  5 mg Oral BID    cilostazol  50 mg Oral BID    rosuvastatin  40 mg Oral Daily    budesonide-formoterol  2 puff Inhalation BID    insulin glargine  30 Units SubCUTAneous Daily    levothyroxine  200 mcg Oral Daily    [Held by provider] lisinopril  10 mg Oral Daily    sodium chloride flush  5-40 mL IntraVENous 2 times per day    insulin lispro  0-8 Units SubCUTAneous TID WC      dextrose      sodium chloride       albuterol sulfate HFA, glucose, dextrose bolus **OR** dextrose bolus, glucagon (rDNA), dextrose, sodium chloride flush, sodium chloride, ondansetron **OR** ondansetron, polyethylene glycol, acetaminophen **OR** acetaminophen    Lab Data:  CBC:   Recent Labs     10/26/22  0122 10/27/22  1943 10/28/22  0413   WBC 7.9 8.7 7.3   HGB 12.5 12.6 12.6   HCT 39.6 40.3 40.2   MCV 94.1 92.6 93.9    157 146     BMP:   Recent Labs     10/26/22  0858 10/27/22  1943 10/28/22  0413    134* 136   K 3.8 3.6 3.3*   CL 96* 91* 93*   CO2 29 32 34*   BUN 10 11 10   CREATININE 0.7 0.7 0.7     PT/INR:   No results for input(s): PROTIME, INR in the last 72 hours. BNP:    Recent Labs     10/26/22  0122   PROBNP 612.8*     TROPONIN:   No results for input(s): TROPONINT in the last 72 hours. Assessment and Recommendations:    Atrial fibrillation with rapid ventricular response  Chronic diastolic heart failure  Chronic peripheral vascular disease        67 y. o.year old with above medical history. She is hemodynamically stable. Her heart rate trend is slightly better. Her blood pressure is stable. At present we will switch her diltiazem to Toprol-XL 50 mg daily. We will continue with amiodarone for her milligrams twice a day and continue with digoxin as well. Upon discharge we should decrease her amiodarone dose to 400 mg daily. I think she should be ready to go home by tomorrow. I also switched her Lasix to torsemide 40 mg daily because of significant lower extremity edema. We will hold off on SAMMY for now and plan on outpatient cardioversion in 3 to 4 weeks. Continue aspirin and cilostazol for peripheral vascular disease. Thank you  much for consult and giving us the opportunity in contributing in the care of this patient. Please feel free to call me for any questions.           Ann-Marie Archuleta MD, MD 10/28/2022 9:28 AM

## 2022-10-28 NOTE — PROGRESS NOTES
Progress Note( Dr. Naresh Godinez)  10/27/2022  Subjective:   Admit Date: 10/24/2022  PCP: Ariana Meadows MD    Admitted For : Atrial fibrillation  with rapid ventricular respons was noted by cardiologist on patient's office visit to him    Consulted For: Better control of blood glucose    Interval History: Patient feels lot better this morning he has atrial fibrillation but rate seem to be as slower    Denies any chest pains,   Denies SOB . Denies nausea or vomiting. No new bowel or bladder symptoms. No intake or output data in the 24 hours ending 10/27/22 2114      DATA    CBC:   Recent Labs     10/26/22  0122 10/27/22  1943   WBC 7.9 8.7   HGB 12.5 12.6    157      CMP:  Recent Labs     10/25/22  0754 10/26/22  0858 10/27/22  1943    136 134*   K 4.3 3.8 3.6    96* 91*   CO2 25 29 32   BUN 11 10 11   CREATININE 0.8 0.7 0.7   CALCIUM 9.4 9.2 9.4       Lipids:   Lab Results   Component Value Date/Time    CHOL 111 10/22/2013 09:59 AM    HDL 39 10/22/2013 09:59 AM    TRIG 183 10/22/2013 09:59 AM     Glucose:  Recent Labs     10/27/22  0745 10/27/22  1044 10/27/22  2026   POCGLU 198* 239* 229*       [id number]:  Lab Results   Component Value Date/Time    LABA1C 5.8 10/24/2022 05:05 PM     High Sensitivity TSH:   Lab Results   Component Value Date/Time    TSHHS 0.793 10/24/2022 10:16 PM     Free T3: No results found for: FT3  Free T4:  Lab Results   Component Value Date/Time    T4FREE 1.75 10/24/2022 10:16 PM       XR CHEST PORTABLE   Final Result   Mild cardiac silhouette enlargement. No evidence of edema or pneumonia.               Scheduled Medicines   Medications:    amiodarone  400 mg Oral BID    furosemide  40 mg Oral Daily    magnesium oxide  400 mg Oral Daily    digoxin  125 mcg Oral Daily    insulin lispro  0-4 Units SubCUTAneous 2 times per day    glipiZIDE  10 mg Oral BID AC    dilTIAZem  60 mg Oral 4 times per day    apixaban  5 mg Oral BID    cilostazol  50 mg Oral BID rosuvastatin  40 mg Oral Daily    budesonide-formoterol  2 puff Inhalation BID    insulin glargine  30 Units SubCUTAneous Daily    levothyroxine  200 mcg Oral Daily    [Held by provider] lisinopril  10 mg Oral Daily    sodium chloride flush  5-40 mL IntraVENous 2 times per day    insulin lispro  0-8 Units SubCUTAneous TID WC      Infusions:    dextrose      sodium chloride           Objective:   Vitals: BP (!) 116/44   Pulse (!) 114   Temp 98.1 °F (36.7 °C) (Oral)   Resp (!) 35   Ht 5' 8\" (1.727 m)   Wt (!) 335 lb 1.6 oz (152 kg)   SpO2 91%   BMI 50.95 kg/m²   General appearance: alert and cooperative with exam  Neck: no JVD or bruit  Thyroid : Normal lobes   Lungs: Has Vesicular Breath sounds home oxygen 24/7  Heart:  Atrial fibrillation   Abdomen: soft, non-tender; bowel sounds normal; no masses,  no organomegaly  Musculoskeletal: Normal  Extremities: extremities normal, , no edema  Neurologic:  Awake, alert, oriented to name, place and time. Cranial nerves II-XII are grossly intact. Motor is  intact. Sensory i neuropathy. ,  and gait is normal.    Assessment:     Patient Active Problem List:     Chronic respiratory failure (HCC)     COPD, mild (HCC)     PAD (peripheral artery disease) (HCC)     SOB (shortness of breath)     Mixed hyperlipidemia     New onset a-fib ([facility] [address]     Class 3 severe obesity due to excess calories with serious comorbidity and body mass index (BMI) of 50.0 to 59.9 in adult [facility])     Primary hypertension     Atrial fibrillation with RVR ([facility] [address]      Plan:     Reviewed POC blood glucose . Labs and X ray results   Reviewed Current Medicines   On meal/ Correction bolus Humalog/ Basal Lantus Insulin regime / and Oral Hypoglycemic drugs   Monitor Blood glucose frequently   Modified  the dose of Insulin/ other medicines as needed   Reviewed notes of cardiology and hospitalist  He was placed on digoxin, in addition to amiodarone, Cardizem and apixaban  Will follow     .      Umesh Jesus, MD, MD 3 = A little assistance

## 2022-10-28 NOTE — PROGRESS NOTES
RRT set-up pt's Home Cpap machine. Pt wears a full face mask, medium size. Pt is on 2lpm bleed in, SaO2 is 93% on 2lpm bleed in, RR 22 Pt is tolerating well.

## 2022-10-28 NOTE — CARE COORDINATION
Patient for SAMMY/CVN now as OP. Patient from home; IPA. She has a PCP and insurance that assist with Rx when needed. No needs anticipated; plan for discharge to home when medically stable.  Dylon Gross RN

## 2022-10-29 NOTE — PROGRESS NOTES
Call initiated by: Nursing staff:  Margarita  Call addressed around: 10/29/2022 7:51 PM      Reason for call: Patient and daughter concern if patient will be able to climb stairs when they get home. Also, they cannot pickup their medications from pharmacy tonight as pharm has already closed. Family requesting dc to be cancelled for tonight and PT/OT eval.      Orders placed:  Will keep patient overnight for pt/ot eval and for attending re-eval in am.        Omar Velázquez, TONIE - CNP

## 2022-10-29 NOTE — PROGRESS NOTES
Progress Note( Dr. Tj Glover)  10/29/2022  Subjective:   Admit Date: 10/24/2022  PCP: Umesh Jesus MD    Admitted For : Atrial fibrillation  with rapid ventricular respons was noted by cardiologist on patient's office visit to him    Consulted For: Better control of blood glucose    Interval History: Patient feels lot better this morning he has atrial fibrillation but rate seem to be as slower  Cardiology decided against cardioversion this time  Stated will try with high-dose amiodarone , metoprolol and digoxin and discontinue Cardizem    Denies any chest pains,   Denies SOB . Denies nausea or vomiting. No new bowel or bladder symptoms. Intake/Output Summary (Last 24 hours) at 10/29/2022 1100  Last data filed at 10/29/2022 0940  Gross per 24 hour   Intake 240 ml   Output --   Net 240 ml         DATA    CBC:   Recent Labs     10/27/22  1943 10/28/22  0413   WBC 8.7 7.3   HGB 12.6 12.6    146      CMP:  Recent Labs     10/27/22  1943 10/28/22  0413   * 136   K 3.6 3.3*   CL 91* 93*   CO2 32 34*   BUN 11 10   CREATININE 0.7 0.7   CALCIUM 9.4 9.2       Lipids:   Lab Results   Component Value Date/Time    CHOL 111 10/22/2013 09:59 AM    HDL 39 10/22/2013 09:59 AM    TRIG 183 10/22/2013 09:59 AM     Glucose:  Recent Labs     10/28/22  2007 10/29/22  0147 10/29/22  0617   POCGLU 164* 147* 164*       [id number]:  Lab Results   Component Value Date/Time    LABA1C 5.8 10/24/2022 05:05 PM     High Sensitivity TSH:   Lab Results   Component Value Date/Time    TSHHS 0.793 10/24/2022 10:16 PM     Free T3: No results found for: FT3  Free T4:  Lab Results   Component Value Date/Time    T4FREE 1.75 10/24/2022 10:16 PM       XR CHEST PORTABLE   Final Result   Mild cardiac silhouette enlargement. No evidence of edema or pneumonia.               Scheduled Medicines   Medications:    metoprolol succinate  50 mg Oral Daily    insulin lispro  0-8 Units SubCUTAneous 2 times per day    torsemide  40 mg Oral Daily    amiodarone  400 mg Oral BID    magnesium oxide  400 mg Oral Daily    digoxin  125 mcg Oral Daily    glipiZIDE  10 mg Oral BID AC    apixaban  5 mg Oral BID    cilostazol  50 mg Oral BID    rosuvastatin  40 mg Oral Daily    budesonide-formoterol  2 puff Inhalation BID    insulin glargine  30 Units SubCUTAneous Daily    levothyroxine  200 mcg Oral Daily    [Held by provider] lisinopril  10 mg Oral Daily    sodium chloride flush  5-40 mL IntraVENous 2 times per day    insulin lispro  0-8 Units SubCUTAneous TID WC      Infusions:    dextrose      sodium chloride           Objective:   Vitals: BP 94/66   Pulse (!) 109   Temp 97.5 °F (36.4 °C) (Oral)   Resp 12   Ht 5' 8\" (1.727 m)   Wt (!) 335 lb 1.6 oz (152 kg)   SpO2 93%   BMI 50.95 kg/m²   General appearance: alert and cooperative with exam  Neck: no JVD or bruit  Thyroid : Normal lobes   Lungs: Has Vesicular Breath sounds home oxygen 24/7  Heart:  Atrial fibrillation   Abdomen: soft, non-tender; bowel sounds normal; no masses,  no organomegaly  Musculoskeletal: Normal  Extremities: extremities normal, , no edema  Neurologic:  Awake, alert, oriented to name, place and time. Cranial nerves II-XII are grossly intact. Motor is  intact. Sensory i neuropathy. ,  and gait is normal.    Assessment:     Patient Active Problem List:     Chronic respiratory failure (HCC)     COPD, mild (HCC)     PAD (peripheral artery disease) (HCC)     SOB (shortness of breath)     Mixed hyperlipidemia     New onset a-fib ([facility] [address]     Class 3 severe obesity due to excess calories with serious comorbidity and body mass index (BMI) of 50.0 to 59.9 in adult [facility])     Primary hypertension     Atrial fibrillation with RVR ([facility] [address]      Plan:     Reviewed POC blood glucose .  Labs and X ray results   Reviewed Current Medicines   On meal/ Correction bolus Humalog/ Basal Lantus Insulin regime / and Oral Hypoglycemic drugs   Monitor Blood glucose frequently   Modified  the dose of Insulin/

## 2022-10-29 NOTE — DISCHARGE INSTRUCTIONS
- Please go to lab in 1 week for blood work  -Monitor your blood pressure  -Follow-up with your cardiologist and PCP

## 2022-10-29 NOTE — PROGRESS NOTES
Cardiology Progress Note     Today's Plan CPM  Sign off   Will be available if needed    Admit Date:  10/24/2022    Consult reason/ Seen today for: atrial fib with RVR    Subjective and  Overnight Events:  up in chair - reports she is feeling better today     Telemetry Atrial fib  Resting rate 105     Assessment / Plan:   Atrial fib RVR- rate controlled : on lanoxin /amiodarone. Was started on toprol yesterday to assist with rate control : Continue eliquis  HFpEF- acute on chronic- shortness of breath improving: on torsemide  PVD-noted to have Left common iliac:severe calcified stenosis at the ostium 80% and  left common femoral artery 80% calcified stenosis: was planned for atherecoty howerver was cancelled: omer stable- on pletal- to follow up as outpatient   Hypertension: BP soft - ACE on hold   Morbid obesity: Bmi 50.94-       History of Presenting Illness:    Chief complain on admission : 67 y. o.year old who is admitted for  Chief Complaint   Patient presents with    Irregular Heart Beat     Atrial fib        Past medical history:    has a past medical history of COPD (chronic obstructive pulmonary disease) ([address] H/O Doppler LOWER ARTERIAL ultrasound, H/O echocardiogram, Hypertension, PAD (peripheral artery disease) Periph Angio(HCC), Polycythemia, Respiratory failure, chronic ([address] and Type II or unspecified type diabetes mellitus without mention of complication, not stated as uncontrolled. Past surgical history:   has a past surgical history that includes Gallbladder surgery (1971). Social History:   reports that she quit smoking about 13 years ago. Her smoking use included cigarettes. She has a 0.13 pack-year smoking history. She has never used smokeless tobacco. She reports that she does not drink alcohol and does not use drugs.   Family history:  family history includes Breast Cancer in her sister; Cancer in her paternal grandmother; Diabetes in her brother, father, mother, and sister; Heart Attack in her brother and father; Hypertension in her brother, father, mother, and sister. No Known Allergies    Review of Systems:   All 14 systems were reviewed and are negative  Except for the positive findings which are documented     BP (!) 94/58   Pulse (!) 105   Temp 97.9 °F (36.6 °C) (Oral)   Resp 26   Ht 5' 8\" (1.727 m)   Wt (!) 335 lb 1.6 oz (152 kg)   SpO2 95%   BMI 50.95 kg/m²   No intake or output data in the 24 hours ending 10/29/22 0816    Physical Exam:  Physical Exam  Vitals reviewed. Constitutional:       Appearance: She is obese. HENT:      Head: Normocephalic. Mouth/Throat:      Mouth: Mucous membranes are moist.   Eyes:      Extraocular Movements: Extraocular movements intact. Cardiovascular:      Rate and Rhythm: Tachycardia present. Rhythm irregular. Heart sounds: No murmur heard. Pulmonary:      Effort: Pulmonary effort is normal.      Breath sounds: Rales present. Musculoskeletal:         General: No tenderness. Right lower leg: Edema present. Left lower leg: Edema present. Skin:     General: Skin is warm. Capillary Refill: Capillary refill takes less than 2 seconds. Neurological:      General: No focal deficit present. Mental Status: She is alert.         Medications:    insulin lispro  0-8 Units SubCUTAneous 2 times per day    torsemide  40 mg Oral Daily    metoprolol succinate  50 mg Oral Daily    amiodarone  400 mg Oral BID    magnesium oxide  400 mg Oral Daily    digoxin  125 mcg Oral Daily    glipiZIDE  10 mg Oral BID AC    apixaban  5 mg Oral BID    cilostazol  50 mg Oral BID    rosuvastatin  40 mg Oral Daily    budesonide-formoterol  2 puff Inhalation BID    insulin glargine  30 Units SubCUTAneous Daily    levothyroxine  200 mcg Oral Daily    [Held by provider] lisinopril  10 mg Oral Daily    sodium chloride flush  5-40 mL IntraVENous 2 times per day insulin lispro  0-8 Units SubCUTAneous TID WC      dextrose      sodium chloride       albuterol sulfate HFA, glucose, dextrose bolus **OR** dextrose bolus, glucagon (rDNA), dextrose, sodium chloride flush, sodium chloride, ondansetron **OR** ondansetron, polyethylene glycol, acetaminophen **OR** acetaminophen    Lab Data:  CBC:   Recent Labs     10/27/22  1943 10/28/22  0413   WBC 8.7 7.3   HGB 12.6 12.6   HCT 40.3 40.2   MCV 92.6 93.9    146     BMP:   Recent Labs     10/26/22  0858 10/27/22  1943 10/28/22  0413    134* 136   K 3.8 3.6 3.3*   CL 96* 91* 93*   CO2 29 32 34*   BUN 10 11 10   CREATININE 0.7 0.7 0.7     PT/INR: No results for input(s): PROTIME, INR in the last 72 hours. BNP:  No results for input(s): PROBNP in the last 72 hours. TROPONIN: No results for input(s): TROPONINT in the last 72 hours. Impression:  Principal Problem:    Atrial fibrillation with RVR ([address]  Resolved Problems:    * No resolved hospital problems. *       All labs, medications and tests reviewed by myself, continue all other medications of all above medical condition listed as is except for changes mentioned above. Thank you   Please call with questions. Electronically signed by TONIE King CNP on 10/29/2022 at 8:16 AM    I have seen ,spoken to  and examined this patient personally, independently of the CATHY. I have reviewed the hospital care given to date and reviewed all pertinent labs and imaging. I have spoken with patient, nursing staff and provided written and verbal instructions . The above note has been reviewed     CARDIOLOGY ATTENDING ADDENDUM    HPI:  I have reviewed the above HPI  And agree with above     Pulse Range: Pulse  Av.1  Min: 98  Max: 117    Blood Presuure Range:  Systolic (14JSY), DGV:203 , Min:94 , UKQ:257   ; Diastolic (01IRA), VKJ:99, Min:56, Max:102      Pulse ox Range: SpO2  Av.2 %  Min: 92 %  Max: 96 %    24hr I & O:    Intake/Output Summary (Last 24 hours) at 10/29/2022 2107  Last data filed at 10/29/2022 1826  Gross per 24 hour   Intake 720 ml   Output --   Net 720 ml         /66   Pulse (!) 105   Temp 97.4 °F (36.3 °C) (Oral)   Resp 30   Ht 5' 8\" (1.727 m)   Wt (!) 335 lb 1.6 oz (152 kg)   SpO2 95%   BMI 50.95 kg/m²       Physical Exam:  General:  Awake, alert, NAD  Head:normal  Eye:normal  Neck:  No JVD   Chest:  Clear to auscultation, respiration easy  Cardiovascular:  RRR S1S2  Abdomen:   nontender  Extremities:  tr edema  Pulses; palpable  Neuro: grossly normal      MEDICAL DECISION MAKING;    Pt assessed , chart reviewed, patient examined examined , all available data was reviewed, following is the plan which was discussed with CATHY as well:    -Patient is noted to be in A. fib however the heart rate is well controlled she is on Lanoxin and amiodarone I discussed with hematology will continue the anticoagulation  - Patient is also has shortness of breath which is probably secondary to diastolic dysfunction secondary to HFpEF and is on diuretics i.e. torsemide  - Hypertension patient's blood pressure was on the lower side hence medications are on hold  - Peripheral vascular disease patient is also noted to have left common iliac artery and left common femoral artery stenosis however it has been managed medically for now and will be handled as an outpatient given that she has other issues  - Morbid obesity BMI is on the higher side weight loss has been discussed with her          Rupert Orr MD [facility]

## 2022-10-29 NOTE — PROGRESS NOTES
RRT checked on pt. Pt is asleep in the recliner, Pt is not on her Home Cpap machine.     Pt tolerating well

## 2022-10-29 NOTE — PROGRESS NOTES
V2.0  [facility] Hospitalist Progress Note      Name:  Lavinia Schuler /Age/Sex: 1945  (67 y.o. female)   MRN & CSN:  [id number] & [id number] Encounter Date/Time: 10/29/2022 2:29 PM EDT    Location:  -A PCP: Denny Pearson MD       Hospital Day: 6      Subjective:     Chief Complaint: Irregular Heart Beat (Atrial fib)     Patient still tachy 100-105's. BP soft was in the 90's overnight but improved today 110's; pt asymptomatic. Denies lightheadedness, dizziness, fever, night sweats, chills, chest pain, cough, dyspnea, palpitations, abd pain, nausea, vomiting, diarrhea, dysuria. Assessment and Plan:   Lavinia Schuler is a 67 y.o. female with pmh of COPD (2L), CHF, DM II, PVD, PRISCA who presents with Atrial fibrillation with RVR ([address]      Plan:  #Atrial fibrillation with rapid ventricular response - improved  Still tachy 's. Likely in the setting of volume overload and history of COPD and PRISCA. High ChadsVascore 5.  -per cardiology, continue Toprol, and to continue with digoxin and amidarone  - continue Eliquis by cardiolgy  - follow up cardiology recs  -Telemetry monitoring  - plan for cardioversion in 3 weeks    #suspected acute on chronic diastolic heart failure - improving. ECHO 10/24/2022 EF 55-60%, moderately dilated RV. CXR clear.  -diuretics changed to Torsemide 40 mg by cardiology  -Intake and output monitoring-Daily weights     #Hypomagnesemia  Replace as needed        #Diabetes Mellitus type II-  On home inslulin and oral meds. HA1C 5.8 10/24/2022.   - SSI  - endo following      #Chronic respiratory failure COPD. On 2L NC at home. Not in exacerbation.  -Continue home oxygen  -Continue home MDI  -Continuous pulse ox     #Peripheral vascular disease: Hx of left common iliac and left common femoral stenosis per angiogram in .   She was to follow-up to have a CSI arthrectomy but failed to follow-up for procedure as COVID-19 shutdown any procedures and was canceled. -Continue Pletal   - outpatient follow up    #Essential Hypertension- BP in the soft side. - hold home lisinopril for now      Diet ADULT DIET; Regular; 4 carb choices (60 gm/meal); Low Sodium (2 gm); 2000 ml   DVT Prophylaxis [] Lovenox, []  Heparin, [] SCDs, [] Ambulation,  [x] Eliquis, [] Xarelto  [] Coumadin   Code Status Full Code   Disposition From: home  Expected Disposition: home  Estimated Date of Discharge: 1-2 days  Patient requires continued admission due to cardiology clearance   Surrogate Decision Maker/ POA      Review of Systems:    Review of Systems    See above    Objective: Intake/Output Summary (Last 24 hours) at 10/29/2022 1433  Last data filed at 10/29/2022 1235  Gross per 24 hour   Intake 480 ml   Output --   Net 480 ml          Vitals:   Vitals:    10/29/22 1200   BP: 113/65   Pulse: (!) 102   Resp: 28   Temp: 97.6 °F (36.4 °C)   SpO2: 96%       Physical Exam:     General: NAD  Eyes: EOMI  ENT: neck supple  Cardiovascular: irregular rate and rhythm  Respiratory: bilateral mild basilar rales  Gastrointestinal: Soft, non tender  Genitourinary: no suprapubic tenderness  Musculoskeletal: 1+ bilateral edema  Skin: warm, dry  Neuro: Alert. Psych: Mood appropriate.      Medications:   Medications:    metoprolol succinate  50 mg Oral Daily    insulin lispro  0-8 Units SubCUTAneous 2 times per day    torsemide  40 mg Oral Daily    amiodarone  400 mg Oral BID    magnesium oxide  400 mg Oral Daily    digoxin  125 mcg Oral Daily    glipiZIDE  10 mg Oral BID AC    apixaban  5 mg Oral BID    cilostazol  50 mg Oral BID    rosuvastatin  40 mg Oral Daily    budesonide-formoterol  2 puff Inhalation BID    insulin glargine  30 Units SubCUTAneous Daily    levothyroxine  200 mcg Oral Daily    [Held by provider] lisinopril  10 mg Oral Daily    sodium chloride flush  5-40 mL IntraVENous 2 times per day    insulin lispro  0-8 Units SubCUTAneous TID WC      Infusions:    dextrose      sodium chloride PRN Meds: albuterol sulfate HFA, 2 puff, Q6H PRN  glucose, 4 tablet, PRN  dextrose bolus, 125 mL, PRN   Or  dextrose bolus, 250 mL, PRN  glucagon (rDNA), 1 mg, PRN  dextrose, , Continuous PRN  sodium chloride flush, 5-40 mL, PRN  sodium chloride, , PRN  ondansetron, 4 mg, Q8H PRN   Or  ondansetron, 4 mg, Q6H PRN  polyethylene glycol, 17 g, Daily PRN  acetaminophen, 650 mg, Q6H PRN   Or  acetaminophen, 650 mg, Q6H PRN      Labs      Recent Results (from the past 24 hour(s))   POCT Glucose    Collection Time: 10/28/22  4:01 PM   Result Value Ref Range    POC Glucose 220 (H) 70 - 99 MG/DL   POCT Glucose    Collection Time: 10/28/22  8:07 PM   Result Value Ref Range    POC Glucose 164 (H) 70 - 99 MG/DL   POCT Glucose    Collection Time: 10/29/22  1:47 AM   Result Value Ref Range    POC Glucose 147 (H) 70 - 99 MG/DL   POCT Glucose    Collection Time: 10/29/22  6:17 AM   Result Value Ref Range    POC Glucose 164 (H) 70 - 99 MG/DL   POCT Glucose    Collection Time: 10/29/22 11:24 AM   Result Value Ref Range    POC Glucose 239 (H) 70 - 99 MG/DL        Imaging/Diagnostics Last 24 Hours   XR CHEST PORTABLE    Result Date: 10/25/2022  EXAMINATION: ONE XRAY VIEW OF THE CHEST 10/24/2022 3:26 pm COMPARISON: 2/10/2020 HISTORY: ORDERING SYSTEM PROVIDED HISTORY: sob TECHNOLOGIST PROVIDED HISTORY: Reason for exam:->sob Reason for Exam: sob FINDINGS: The heart size is enlarged. There is no pneumothorax, edema or focal consolidation. The bones are demineralized. Patient body habitus limits the exam.     Mild cardiac silhouette enlargement. No evidence of edema or pneumonia.        Electronically signed by Dolores Jacobsen MD on 10/29/2022 at 2:33 PM

## 2022-10-30 NOTE — PLAN OF CARE
Holding PT eval for today. Patient ambulating well per nursing flowsheets -- 200 ft and independent. Intend to eval stairs as component for safe discharge. However, after reading today's APRN note, attempting stairs right today is not safe. Concerned about resting fatigue, stated weakness and lightheadedness, and resting HR of 120. Intend to f/u and eval as condition normalizes and patient continues with nursing ambulation in carter.     Mago Banks, [facility] [place] [id number]  11:17 AM 10/30/2022

## 2022-10-30 NOTE — PROGRESS NOTES
Cardiology Progress Note     Today's Plan: plan for cardioversion  in am  Consult EP       Admit Date:  10/24/2022    Consult reason/ Seen today for: atrial fib with RVR    Subjective and  Overnight Events:  up in chair -states she is feeling tired and weak- feels lightheaded    Telemetry Atrial fib  Resting rate 120-    Assessment / Plan:   Atrial fib RVR- rate is not well controlled- plan for SAMMY/cardioversion am  HFpEF- acute on chronic- shortness of breath improving: on torsemide  PVD-noted to have Left common iliac:severe calcified stenosis at the ostium 80% and  left common femoral artery 80% calcified stenosis: was planned for atherecoty howerver was cancelled: omer stable- on pletal- to follow up as outpatient   Hypertension: BP soft - ACE on hold   Morbid obesity: Bmi 50.94-       History of Presenting Illness:    Chief complain on admission : 67 y. o.year old who is admitted for  Chief Complaint   Patient presents with    Irregular Heart Beat     Atrial fib        Past medical history:    has a past medical history of COPD (chronic obstructive pulmonary disease) ([address] H/O Doppler LOWER ARTERIAL ultrasound, H/O echocardiogram, Hypertension, PAD (peripheral artery disease) Periph Angio(HCC), Polycythemia, Respiratory failure, chronic ([address] and Type II or unspecified type diabetes mellitus without mention of complication, not stated as uncontrolled. Past surgical history:   has a past surgical history that includes Gallbladder surgery (1971). Social History:   reports that she quit smoking about 13 years ago. Her smoking use included cigarettes. She has a 0.13 pack-year smoking history. She has never used smokeless tobacco. She reports that she does not drink alcohol and does not use drugs.   Family history:  family history includes Breast Cancer in her sister; Cancer in her paternal grandmother; Diabetes in her brother, father, mother, and sister; Heart Attack in her brother and father; Hypertension in her brother, father, mother, and sister. No Known Allergies    Review of Systems:   All 14 systems were reviewed and are negative  Except for the positive findings which are documented     BP (!) 103/57   Pulse (!) 109   Temp 97.3 °F (36.3 °C) (Oral)   Resp 20   Ht 5' 8\" (1.727 m)   Wt (!) 337 lb 4.9 oz (153 kg)   SpO2 96%   BMI 51.29 kg/m²     Intake/Output Summary (Last 24 hours) at 10/30/2022 0952  Last data filed at 10/30/2022 0756  Gross per 24 hour   Intake 1040 ml   Output --   Net 1040 ml       Physical Exam:  Physical Exam  Vitals and nursing note reviewed. Constitutional:       Appearance: She is obese. HENT:      Head: Normocephalic. Mouth/Throat:      Mouth: Mucous membranes are moist.   Eyes:      Extraocular Movements: Extraocular movements intact. Cardiovascular:      Rate and Rhythm: Tachycardia present. Rhythm irregular. Heart sounds: No murmur heard. Pulmonary:      Effort: Pulmonary effort is normal.      Breath sounds: Rales present. Musculoskeletal:         General: No tenderness. Right lower leg: Edema present. Left lower leg: Edema present. Skin:     General: Skin is warm. Capillary Refill: Capillary refill takes less than 2 seconds. Neurological:      Mental Status: She is alert and oriented to person, place, and time.         Medications:    metoprolol succinate  50 mg Oral Daily    insulin lispro  0-8 Units SubCUTAneous 2 times per day    torsemide  40 mg Oral Daily    amiodarone  400 mg Oral BID    magnesium oxide  400 mg Oral Daily    digoxin  125 mcg Oral Daily    glipiZIDE  10 mg Oral BID AC    apixaban  5 mg Oral BID    cilostazol  50 mg Oral BID    rosuvastatin  40 mg Oral Daily    budesonide-formoterol  2 puff Inhalation BID    insulin glargine  30 Units SubCUTAneous Daily    levothyroxine  200 mcg Oral Daily    [Held by provider] lisinopril  10 mg Oral Daily    sodium chloride flush  5-40 mL IntraVENous 2 times per day    insulin lispro  0-8 Units SubCUTAneous TID WC      dextrose      sodium chloride       albuterol sulfate HFA, glucose, dextrose bolus **OR** dextrose bolus, glucagon (rDNA), dextrose, sodium chloride flush, sodium chloride, ondansetron **OR** ondansetron, polyethylene glycol, acetaminophen **OR** acetaminophen    Lab Data:  CBC:   Recent Labs     10/27/22  1943 10/28/22  0413 10/29/22  1735   WBC 8.7 7.3 7.3   HGB 12.6 12.6 12.9   HCT 40.3 40.2 41.3   MCV 92.6 93.9 92.6    146 166       BMP:   Recent Labs     10/27/22  1943 10/28/22  0413 10/29/22  1735   * 136 137   K 3.6 3.3* 3.8   CL 91* 93* 91*   CO2 32 34* 39*   BUN 11 10 14   CREATININE 0.7 0.7 0.9       PT/INR: No results for input(s): PROTIME, INR in the last 72 hours. BNP:  No results for input(s): PROBNP in the last 72 hours. TROPONIN: No results for input(s): TROPONINT in the last 72 hours. Impression:  Principal Problem:    Atrial fibrillation with rapid ventricular response (HCC)  Resolved Problems:    * No resolved hospital problems. *       All labs, medications and tests reviewed by myself, continue all other medications of all above medical condition listed as is except for changes mentioned above. Thank you   Please call with questions. Electronically signed by TONIE Crowlye CNP on 10/30/2022 at 9:52 AM    I have seen ,spoken to  and examined this patient personally, independently of the CATHY. I have reviewed the hospital care given to date and reviewed all pertinent labs and imaging. I have spoken with patient, nursing staff and provided written and verbal instructions . The above note has been reviewed     CARDIOLOGY ATTENDING ADDENDUM    HPI:  I have reviewed the above HPI  And agree with above     Pulse Range: Pulse  Av  Min: 102  Max: 117    Blood Presuure Range:  Systolic (78HKY), MOW:175 , Min:98 , LWK:918   ; Diastolic (17GIW), Av, Min:56, Max:102      Pulse ox Range: SpO2  Av.3 %  Min: 94 %  Max: 96 %    24hr I & O:    Intake/Output Summary (Last 24 hours) at 10/30/2022 1210  Last data filed at 10/30/2022 0756  Gross per 24 hour   Intake 1040 ml   Output --   Net 1040 ml           BP (!) 103/57   Pulse (!) 109   Temp 97.3 °F (36.3 °C) (Oral)   Resp 20   Ht 5' 8\" (1.727 m)   Wt (!) 337 lb 4.9 oz (153 kg)   SpO2 96%   BMI 51.29 kg/m²       Physical Exam:  General:  Awake, alert, NAD  Head:normal  Eye:normal  Neck:  No JVD   Chest:  Clear to auscultation, respiration easy  Cardiovascular:  RRR S1S2  Abdomen:   nontender  Extremities:  tr edema  Pulses; palpable  Neuro: grossly normal      MEDICAL DECISION MAKING;    Pt assessed , chart reviewed, patient examined examined , all available data was reviewed, following is the plan which was discussed with CATHY as well:    -Patient is noted to be in A. fib however the heart rate is well controlled she is on Lanoxin and amiodarone ,  We will proceed with SAMMY and cardioversion in the morning I explained the procedure to herself and her daughter over monitoring regarding the SAMMY tomorrow they thought that the procedure will be done 3 weeks later I told him there are both options anticoagulation for a few weeks and then cardioversion or a SAMMY guided cardioversion however they are opting for SAMMY guided cardioversion was performed tomorrow  - Patient is also has shortness of breath which is probably secondary to diastolic dysfunction secondary to HFpEF and is on diuretics i.e. torsemide  - Hypertension patient's blood pressure was on the lower side hence medications are on hold  - Peripheral vascular disease patient is also noted to have left common iliac artery and left common femoral artery stenosis however it has been managed medically for now and will be handled as an outpatient given that she has other issues  - Morbid obesity BMI is on the higher side weight loss has been discussed with her          Lewis Pederson MD [facility]

## 2022-10-30 NOTE — PROGRESS NOTES
Progress Note( Dr. Jose Maria Brown)  10/30/2022  Subjective:   Admit Date: 10/24/2022  PCP: Bart Parada MD    Admitted For : Atrial fibrillation  with rapid ventricular respons was noted by cardiologist on patient's office visit to him    Consulted For: Better control of blood glucose    Interval History: Patient feels lot better this morning he has atrial fibrillation but rate seem to be as slower  Cardiology decided against cardioversion this time  Stated will try with high-dose amiodarone , metoprolol and digoxin and discontinue Cardizem    Denies any chest pains,   Denies SOB . Denies nausea or vomiting. No new bowel or bladder symptoms. Intake/Output Summary (Last 24 hours) at 10/30/2022 1114  Last data filed at 10/30/2022 0756  Gross per 24 hour   Intake 1040 ml   Output --   Net 1040 ml         DATA    CBC:   Recent Labs     10/27/22  1943 10/28/22  0413 10/29/22  1735   WBC 8.7 7.3 7.3   HGB 12.6 12.6 12.9    146 166      CMP:  Recent Labs     10/27/22  1943 10/28/22  0413 10/29/22  1735   * 136 137   K 3.6 3.3* 3.8   CL 91* 93* 91*   CO2 32 34* 39*   BUN 11 10 14   CREATININE 0.7 0.7 0.9   CALCIUM 9.4 9.2 8.9       Lipids:   Lab Results   Component Value Date/Time    CHOL 111 10/22/2013 09:59 AM    HDL 39 10/22/2013 09:59 AM    TRIG 183 10/22/2013 09:59 AM     Glucose:  Recent Labs     10/29/22  2036 10/30/22  0228 10/30/22  0643   POCGLU 151* 138* 147*       [id number]:  Lab Results   Component Value Date/Time    LABA1C 5.8 10/24/2022 05:05 PM     High Sensitivity TSH:   Lab Results   Component Value Date/Time    TSHHS 0.793 10/24/2022 10:16 PM     Free T3: No results found for: FT3  Free T4:  Lab Results   Component Value Date/Time    T4FREE 1.75 10/24/2022 10:16 PM       XR CHEST PORTABLE   Final Result   Mild cardiac silhouette enlargement. No evidence of edema or pneumonia.               Scheduled Medicines   Medications:    metoprolol succinate  50 mg Oral Daily    insulin lispro  0-8 Units SubCUTAneous 2 times per day    torsemide  40 mg Oral Daily    amiodarone  400 mg Oral BID    magnesium oxide  400 mg Oral Daily    digoxin  125 mcg Oral Daily    glipiZIDE  10 mg Oral BID AC    apixaban  5 mg Oral BID    cilostazol  50 mg Oral BID    rosuvastatin  40 mg Oral Daily    budesonide-formoterol  2 puff Inhalation BID    insulin glargine  30 Units SubCUTAneous Daily    levothyroxine  200 mcg Oral Daily    [Held by provider] lisinopril  10 mg Oral Daily    sodium chloride flush  5-40 mL IntraVENous 2 times per day    insulin lispro  0-8 Units SubCUTAneous TID WC      Infusions:    dextrose      sodium chloride           Objective:   Vitals: BP (!) 103/57   Pulse (!) 109   Temp 97.3 °F (36.3 °C) (Oral)   Resp 20   Ht 5' 8\" (1.727 m)   Wt (!) 337 lb 4.9 oz (153 kg)   SpO2 96%   BMI 51.29 kg/m²   General appearance: alert and cooperative with exam  Neck: no JVD or bruit  Thyroid : Normal lobes   Lungs: Has Vesicular Breath sounds home oxygen 24/7  Heart:  Atrial fibrillation   Abdomen: soft, non-tender; bowel sounds normal; no masses,  no organomegaly  Musculoskeletal: Normal  Extremities: extremities normal, , no edema  Neurologic:  Awake, alert, oriented to name, place and time. Cranial nerves II-XII are grossly intact. Motor is  intact. Sensory i neuropathy. ,  and gait is normal.    Assessment:     Patient Active Problem List:     Chronic respiratory failure (HCC)     COPD, mild (HCC)     PAD (peripheral artery disease) (HCC)     SOB (shortness of breath)     Mixed hyperlipidemia     New onset a-fib ([facility]     Class 3 severe obesity due to excess calories with serious comorbidity and body mass index (BMI) of 50.0 to 59.9 in adult [facility])     Primary hypertension     Atrial fibrillation with RVR ([facility]      Plan:     Reviewed POC blood glucose .  Labs and X ray results   Reviewed Current Medicines   On meal/ Correction bolus Humalog/ Basal Lantus Insulin regime / and Oral Hypoglycemic drugs   Monitor Blood glucose frequently   Modified  the dose of Insulin/ other medicines as needed   Reviewed notes of cardiology and hospitalist  Now plan is to cardiovert tomorrow. Consulting EP  Will follow     .      Yung Boss MD, MD

## 2022-10-30 NOTE — PROGRESS NOTES
V2.0  [facility] [place] Hospitalist Progress Note      Name:  Judge Monson /Age/Sex: 1945  (67 y.o. female)   MRN & CSN:  [id number] & [id number] Encounter Date/Time: 10/30/2022 2:29 PM EDT    Location:  -A PCP: Angelia Pratt MD       Hospital Day: 7      Subjective:     Chief Complaint: Irregular Heart Beat (Atrial fib)     Patient was cleared for discharge by cardiology yesterday. Pt orthosats were negative in the afternoon. Labs ordered earlier in the morning but came back late at 5 PM.  Discharge order was placed, however, family was concerned over patient not receiving her meds as pharmacy is closed. In addition they were concerned over the patient's ability to climb stairs and ambulate; they requested PT/OT evaluation. Discharge order was cancelled. Patient still tachy 105-110's. BP stable was in the 90's overnight but improved today 110's; pt asymptomatic. She states she felt lightheaded earlier today lasted for 30 seconds only. Seen by cardiology who had discussion with pt. Plan for SAMMY and cardioversion tomorrow. Denies  fever, night sweats, chills, chest pain, cough, dyspnea, palpitations, abd pain, nausea, vomiting, diarrhea, dysuria. Assessment and Plan:   Judge Monson is a 67 y.o. female with pmh of COPD (2L), CHF, DM II, PVD, PRISCA who presents with Atrial fibrillation with rapid ventricular response ([address]      Plan:  #Atrial fibrillation with rapid ventricular response - improved  Still tachy 105-110's. Likely in the setting of volume overload and history of COPD and PRISCA. High [place]ascore 5.  -per cardiology, continue Toprol, and to continue with digoxin and amidarone  - continue Eliquis by cardiolgy  - follow up cardiology recs  -Telemetry monitoring  - plan for SAMMY/cardioversion tomorrow    #suspected acute on chronic diastolic heart failure - improving. ECHO 10/24/2022 EF 55-60%, moderately dilated RV.  CXR clear.  -diuretics changed to Torsemide 40 mg by cardiology  -Intake and output monitoring-Daily weights     #Hypomagnesemia  Replace as needed        #Diabetes Mellitus type II-  On home inslulin and oral meds. HA1C 5.8 10/24/2022.   - SSI  - endo following      #Chronic respiratory failure COPD. On 2L NC at home. Not in exacerbation.  -Continue home oxygen  -Continue home MDI  -Continuous pulse ox     #Peripheral vascular disease: Hx of left common iliac and left common femoral stenosis per angiogram in 2020. She was to follow-up to have a CSI arthrectomy but failed to follow-up for procedure as COVID-19 shutdown any procedures and was canceled. -Continue Pletal   - outpatient follow up    #Essential Hypertension- BP stable today. - hold home lisinopril for now      Diet ADULT DIET; Regular; 4 carb choices (60 gm/meal); Low Sodium (2 gm); 2000 ml  Diet NPO   DVT Prophylaxis [] Lovenox, []  Heparin, [] SCDs, [] Ambulation,  [x] Eliquis, [] Xarelto  [] Coumadin   Code Status Full Code   Disposition From: home  Expected Disposition: home with Ana Mcfarlane  Estimated Date of Discharge: likely 10/31  Patient requires continued admission due plan for SAMMY/cardioversion tomorrow   Surrogate Decision Maker/ POA      Review of Systems:    Review of Systems    See above    Objective: Intake/Output Summary (Last 24 hours) at 10/30/2022 1224  Last data filed at 10/30/2022 0756  Gross per 24 hour   Intake 1040 ml   Output --   Net 1040 ml          Vitals:   Vitals:    10/30/22 1115   BP: 119/74   Pulse: (!) 107   Resp: 19   Temp: 97.9 °F (36.6 °C)   SpO2: 97%       Physical Exam:     General: NAD  Eyes: EOMI  ENT: neck supple  Cardiovascular: irregular rate and rhythm  Respiratory: bilateral mild basilar rales  Gastrointestinal: Soft, non tender  Genitourinary: no suprapubic tenderness  Musculoskeletal: 1+ bilateral edema  Skin: warm, dry  Neuro: Alert. Psych: Mood appropriate.      Medications:   Medications:    metoprolol succinate  50 mg Oral Daily    insulin lispro  0-8 Units SubCUTAneous 2 times per day    torsemide  40 mg Oral Daily    amiodarone  400 mg Oral BID    magnesium oxide  400 mg Oral Daily    digoxin  125 mcg Oral Daily    glipiZIDE  10 mg Oral BID AC    apixaban  5 mg Oral BID    cilostazol  50 mg Oral BID    rosuvastatin  40 mg Oral Daily    budesonide-formoterol  2 puff Inhalation BID    insulin glargine  30 Units SubCUTAneous Daily    levothyroxine  200 mcg Oral Daily    [Held by provider] lisinopril  10 mg Oral Daily    sodium chloride flush  5-40 mL IntraVENous 2 times per day    insulin lispro  0-8 Units SubCUTAneous TID WC      Infusions:    dextrose      sodium chloride       PRN Meds: albuterol sulfate HFA, 2 puff, Q6H PRN  glucose, 4 tablet, PRN  dextrose bolus, 125 mL, PRN   Or  dextrose bolus, 250 mL, PRN  glucagon (rDNA), 1 mg, PRN  dextrose, , Continuous PRN  sodium chloride flush, 5-40 mL, PRN  sodium chloride, , PRN  ondansetron, 4 mg, Q8H PRN   Or  ondansetron, 4 mg, Q6H PRN  polyethylene glycol, 17 g, Daily PRN  acetaminophen, 650 mg, Q6H PRN   Or  acetaminophen, 650 mg, Q6H PRN      Labs      Recent Results (from the past 24 hour(s))   POCT Glucose    Collection Time: 10/29/22  4:17 PM   Result Value Ref Range    POC Glucose 148 (H) 70 - 99 MG/DL   CBC with Auto Differential    Collection Time: 10/29/22  5:35 PM   Result Value Ref Range    WBC 7.3 4.0 - 10.5 K/CU MM    RBC 4.46 4.2 - 5.4 M/CU MM    Hemoglobin 12.9 12.5 - 16.0 GM/DL    Hematocrit 41.3 37 - 47 %    MCV 92.6 78 - 100 FL    MCH 28.9 27 - 31 PG    MCHC 31.2 (L) 32.0 - 36.0 %    RDW 14.5 11.7 - 14.9 %    Platelets 888 268 - 409 K/CU MM    MPV 10.8 7.5 - 11.1 FL    Differential Type AUTOMATED DIFFERENTIAL     Segs Relative 62.8 36 - 66 %    Lymphocytes % 20.8 (L) 24 - 44 %    Monocytes % 12.9 (H) 0 - 4 %    Eosinophils % 2.7 0 - 3 %    Basophils % 0.5 0 - 1 %    Segs Absolute 4.6 K/CU MM    Lymphocytes Absolute 1.5 K/CU MM    Monocytes Absolute 0.9 K/CU MM    Eosinophils Absolute 0.2 K/CU

## 2022-10-30 NOTE — PROGRESS NOTES
Occupational [address] [place] [address] ACUTE CARE OCCUPATIONAL THERAPY EVALUATION    Kandice Kessler, 1945, 2011/2011-A, 10/30/2022    Discharge Recommendation: Home w/ assist prn + Home Health OT      History:  [place]:  The primary encounter diagnosis was Atrial fibrillation with rapid ventricular response ([facility] [address] Diagnoses of Hypervolemia, unspecified hypervolemia type, Hypomagnesemia, and Atrial fibrillation with RVR ([address] were also pertinent to this visit. Past Medical History:   Diagnosis Date    COPD (chronic obstructive pulmonary disease) ([facility] [address]     H/O Doppler LOWER ARTERIAL ultrasound 11/06/2019    The Left CFA exhibits calcific plaquing with elevated PSV,  Right ABIs show Mild PAD. The Left AUDREY shows Moderate peripheral arterial disease. H/O echocardiogram 11/06/2019    EF 55-60%, Mild aortic stenosis with mean gradient of 13 mmHg. Hypertension     PAD (peripheral artery disease) Periph Angio([facility]) 02/11/2020     Severe left inflow stenosis of left common iliac and left common  femoral artery, will plan CSI atherectomy.     Polycythemia     Respiratory failure, chronic ([facility])     Type II or unspecified type diabetes mellitus without mention of complication, not stated as uncontrolled          Subjective:  Patient states: \"I should be okay I just have those 2 stairs I'm not sure about\"  Pain:  denies  Communication with other providers: MYNOR Martin requesting eval completed this am  Restrictions: General Precautions, Fall Risk    Home Setup/Prior level of function:  Social/Functional History  Lives With: Alone (daughter and two sons live on the same farm)  Type of Home: House  Home Layout: One level  Home Access: Stairs to enter without rails  Entrance Stairs - Number of Steps: 2  Bathroom Shower/Tub: Walk-in shower  Bathroom Toilet: Standard  Bathroom Equipment: Built-in shower seat, Grab bars in [id number]: [address], [place], Walker, standard, Cane, Oxygen, [address], Lift chair  Has the patient had two or more falls in the past year or any fall with injury in the past year?: No  ADL Assistance: Independent  Homemaking Assistance: Independent  Ambulation Assistance: Independent (w/o AD)  Transfer Assistance: Independent  Active : Yes     Examination:  Observation: Seated in recliner upon arrival, agreeable to therapy eval.  Vision: WFL  Hearing: WFL  Vitals: Stable vitals throughout session on 2L O2 (baseline). -120 w/ activity. Denies dizziness or lightheadedness      Body Systems and functions:  ROM: WFL   Strength: B UE grossly 4+/5 across all major joints   Sensation: WFL  Tone: Normal  Coordination: WFL  Perception: WNL      Cognitive and Psychosocial Functioning:  Overall cognitive status: alert and oriented, WFL  Affect: Normal       Functional Mobility:  Bed mobility:  NT  Sitting balance:  SBA    Transfers: SBA sit <> Stand to/from recliner  Standing balance:  SBA   Functional Mobility: ambulated short functional household distance x2 bouts using RW w/ SBA  Toilet/Shower Transfers: supervision stand <> sit from standard toilet        Activities of Daily Living (ADLs):  Feeding: set up A  Grooming: SBA hand hygiene while standing at the sink  UB bathing: SBA  LB bathing: SBA  UB dressing: SBA  LB dressing: SBA  Toileting: supervision    *ADL determined per observation of functional mobility, balance, activity tolerance, cognition, or actual ADL performance. AM-PAC 6 click short form for inpatient daily activity:   How much help from another person does the patient currently need. .. Unable  Dep A Lot  Max A A Lot   Mod A A Little  Min A A Little   CGA  SBA None   Mod I  Indep  Sup   1. Putting on and taking off regular lower body clothing? [] 1    [] 2   [] 2   [] 3   [x] 3   [] 4      2. Bathing (including washing, rinsing, drying)? [] 1   [] 2   [] 2 [] 3 [x] 3 [] 4   3.  Toileting, which includes using toilet, bedpan, or urinal? [] 1    [] 2   [] 2   [] 3 [x] 3   [] 4     4. Putting on and taking off regular upper body clothing? [] 1   [] 2   [] 2   [] 3   [x] 3    [] 4      5. Taking care of personal grooming such as brushing teeth? [] 1   [] 2    [] 2 [] 3    [x] 3   [] 4      6. Eating meals? [] 1   [] 2   [] 2   [] 3   [] 3   [x] 4        Raw Score:  19     [24=0% impaired(CH), 23=1-19%(CI), 20-22=20-39%(CJ), 15-19=40-59%(CK), 10-14=60-79%(CL), 7-9=80-99%(CM), 6=100%(CN)]     Treatment:    Self Care Training:   Cues were given for safety, sequence, UE/LE placement, visual cues, and balance. Activities performed including toileting, hand hygiene. Educated pt on role of OT, therapy POC and functional goals, progression w/ ADLs and transfers, importance of movement and OOB activity, d/c recommendations     Safety Measures: Gait belt used, Left in recliner, Alarm in place  Recommendations for NURSING activity:  Up to chair for all 3 meals and up to bathroom for all toileting needs     Assessment:  Pt is a 67 y o F admitted d/t A-fib w/ RVR. Pt at baseline is IND for ADLs, IND for high level IADLs, and IND for functional transfers/mobility w/o AD. Pt currently presents w/ deficits in ADL and high level IADL independence, functional ADL transfers, strength, and functional activity tolerance. Continued OT services recommended to increase safety and independence with ADL routine and to address remaining functional deficits. Pt would benefit from continued acute care OT services w/ discharge to home w/ assist prn + HH OT. Complexity: Moderate  Prognosis: Good, no significant barriers to participation at this time.    Occupational Therapy Plan  Times Per Week: 3+  Current Treatment Recommendations: Strengthening, ROM, Functional mobility training, Endurance training, Patient/Caregiver education & training, Safety education & training, Equipment evaluation, education, & procurement, Positioning, Self-Care / ADL, Return to work related activity Goals:  Pt will complete all aspects of bed mobility for EOB/OOB ADLs w/ mod I. Pt will complete UB ADLs w/ mod I. Pt will complete LB ADLs w/ mod I. Pt will complete all functional transfers to and from bed, chair, toilet, shower chair w/ mod I. Pt will ambulate functional household distance w/ mod I. Pt will complete all aspects of toileting task w/ mod I. Pt will perform therex/theract in order to increase strength and functional activity tolerance necessary for increased independence w/ ADL routine.     Pt goal: go home, get stronger  Time Frame for STGs: discharge    Equipment: defer    Time:   Time in: 1210  Time out: 1231  Total time: 21  Timed treatment minutes: 11        Electronically signed by:      MARKIE Vanessa/LUIS MIGUEL  [id number]

## 2022-10-31 NOTE — PROGRESS NOTES
history:  family history includes Breast Cancer in her sister; Cancer in her paternal grandmother; Diabetes in her brother, father, mother, and sister; Heart Attack in her brother and father; Hypertension in her brother, father, mother, and sister. No Known Allergies    Review of Systems:  Review of Systems   Respiratory:  Negative for shortness of breath. Cardiovascular:  Positive for leg swelling. Negative for chest pain and palpitations. Musculoskeletal: Negative. Skin: Negative. Neurological:  Negative for dizziness and weakness. All other systems reviewed and are negative. /84   Pulse (!) 102   Temp 97.3 °F (36.3 °C) (Oral)   Resp 26   Ht 5' 8\" (1.727 m)   Wt (!) 335 lb 1.6 oz (152 kg)   SpO2 95%   BMI 50.95 kg/m²     Intake/Output Summary (Last 24 hours) at 10/31/2022 1332  Last data filed at 10/31/2022 0650  Gross per 24 hour   Intake 450 ml   Output --   Net 450 ml         Physical Exam:  Physical Exam  Vitals and nursing note reviewed. Constitutional:       Appearance: She is well-developed. She is obese. HENT:      Head: Normocephalic. Mouth/Throat:      Mouth: Mucous membranes are moist.   Eyes:      Extraocular Movements: Extraocular movements intact. Cardiovascular:      Rate and Rhythm: Tachycardia present. Rhythm irregular. Pulses: Intact distal pulses. Dorsalis pedis pulses are 2+ on the right side and 2+ on the left side. Posterior tibial pulses are 2+ on the right side and 2+ on the left side. Heart sounds: Normal heart sounds, S1 normal and S2 normal. No murmur heard. Pulmonary:      Effort: Pulmonary effort is normal.      Breath sounds: Normal breath sounds. No rales. Musculoskeletal:         General: No tenderness. Normal range of motion. Right lower leg: Edema present. Left lower leg: Edema present. Skin:     General: Skin is warm and dry. Capillary Refill: Capillary refill takes less than 2 seconds. Neurological:      Mental Status: She is alert and oriented to person, place, and time. Medications:    metoprolol succinate  50 mg Oral Daily    insulin lispro  0-8 Units SubCUTAneous 2 times per day    torsemide  40 mg Oral Daily    amiodarone  400 mg Oral BID    magnesium oxide  400 mg Oral Daily    digoxin  125 mcg Oral Daily    glipiZIDE  10 mg Oral BID AC    apixaban  5 mg Oral BID    cilostazol  50 mg Oral BID    rosuvastatin  40 mg Oral Daily    budesonide-formoterol  2 puff Inhalation BID    insulin glargine  30 Units SubCUTAneous Daily    levothyroxine  200 mcg Oral Daily    [Held by provider] lisinopril  10 mg Oral Daily    sodium chloride flush  5-40 mL IntraVENous 2 times per day    insulin lispro  0-8 Units SubCUTAneous TID WC      dextrose      sodium chloride       albuterol sulfate HFA, glucose, dextrose bolus **OR** dextrose bolus, glucagon (rDNA), dextrose, sodium chloride flush, sodium chloride, ondansetron **OR** ondansetron, polyethylene glycol, acetaminophen **OR** acetaminophen    Lab Data:  CBC:   Recent Labs     10/29/22  1735   WBC 7.3   HGB 12.9   HCT 41.3   MCV 92.6          BMP:   Recent Labs     10/29/22  1735      K 3.8   CL 91*   CO2 39*   BUN 14   CREATININE 0.9       PT/INR: No results for input(s): PROTIME, INR in the last 72 hours. BNP:  No results for input(s): PROBNP in the last 72 hours. TROPONIN: No results for input(s): TROPONINT in the last 72 hours. Impression:  Principal Problem:    Atrial fibrillation with rapid ventricular response (HCC)  Resolved Problems:    * No resolved hospital problems. *       All labs, medications and tests reviewed by myself, continue all other medications of all above medical condition listed as is except for changes mentioned above. Thank you   Please call with questions. Electronically signed by TONIE Thakur CNP on 10/31/2022 at 1:32 PM    I have seen ,spoken to  and examined this patient personally, independently of the CATHY. I have reviewed the hospital care given to date and reviewed all pertinent labs and imaging. I have spoken with patient, nursing staff and provided written and verbal instructions . The above note has been reviewed     CARDIOLOGY ATTENDING ADDENDUM    HPI:  I have reviewed the above HPI  And agree with above     Pulse Range: Pulse  Av  Min: 95  Max: 118    Blood Presuure Range:  Systolic (59VOF), MVA:256 , Min:81 , TTU:049   ; Diastolic (45CQK), JVB:25, Min:52, Max:86      Pulse ox Range: SpO2  Av.9 %  Min: 91 %  Max: 99 %    24hr I & O:    Intake/Output Summary (Last 24 hours) at 10/31/2022 1421  Last data filed at 10/31/2022 0650  Gross per 24 hour   Intake 450 ml   Output --   Net 450 ml         /60   Pulse 96   Temp 97.3 °F (36.3 °C) (Oral)   Resp 24   Ht 5' 8\" (1.727 m)   Wt (!) 335 lb 1.6 oz (152 kg)   SpO2 96%   BMI 50.95 kg/m²       Physical Exam:  General:  Awake, alert, NAD  Head:normal  Eye:normal  Neck:  No JVD   Chest:  Clear to auscultation, respiration easy  Cardiovascular:  RRR S1S2  Abdomen:   nontender  Extremities: Trace edema  Pulses; palpable  Neuro: grossly normal      MEDICAL DECISION MAKING;    Pt assessed , chart reviewed, patient examined examined , all available data was reviewed, following is the plan which was discussed with CATHY as well:    -Patient is noted to be in A. fib however the heart rate is well controlled she is on Lanoxin and amiodarone ,  We will proceed with SAMMY and cardioversion in the morning I explained the procedure to herself and her daughter over monitoring regarding the SAMMY tomorrow they thought that the procedure will be done 3 weeks later I told him there are both options anticoagulation for a few weeks and then cardioversion or a SAMMY guided cardioversion however they are opting for SAMMY guided cardioversion was performed tomorrow  - Patient is also has shortness of breath which is probably secondary to diastolic dysfunction secondary to HFpEF and is on diuretics i.e. torsemide  - Hypertension patient's blood pressure was on the lower side hence medications are on hold  - Peripheral vascular disease patient is also noted to have left common iliac artery and left common femoral artery stenosis however it has been managed medically for now and will be handled as an outpatient given that she has other issues  - Morbid obesity BMI is on the higher side weight loss has been discussed with her          Emerita Feliciano MD [facility]

## 2022-10-31 NOTE — PROGRESS NOTES
Physical Therapy  Attempt Note    PT eval deferred per triage. She is out to imaging/eval for SAMMY and potential cardioversion ; time and appropriateness in question. Will cont to follow and assist in DC planning at a later date.     Tye Copeland PT, DPT

## 2022-10-31 NOTE — CARE COORDINATION
Chart reviewed for IDR needs. PT deferred eval as patient is walking >200 ft. OT recs home with assist and HHC. Will follow for [facility] needs.  Shahzad Verde RN

## 2022-10-31 NOTE — CONSULTS
Electrophysiology Consult Note      Reason for consultation:  atrial fibrillation    Chief complaint : Bilateral lower leg swelling    Referring physician: Celena Jacome      Primary care physician: Denny Pearson MD      History of Present Illness:     Ricardo López is a 54-year-old female with a history of chronic obstructive pulmonary disease, peripheral vascular disease, hypertension, type 2 diabetes presents with complaints of bilateral lower leg swelling. Patient was initially seen at the [facility] with complaints of bilateral lower leg swelling. Patient reports that the symptoms have gradually worsened over the last 3 weeks spite taking Lasix daily. Patient states that 2 years ago she was to have a lower extremity angiogram however when COVID hit this procedure was canceled. Patient was lost to follow-up. Patient reports that when she noticed the bilateral lower leg swelling she figured this was related to her peripheral artery disease. Patient scheduled an appointment with the [facility] and was found to be in atrial fibrillation with RVR. Patient was subsequently sent to the emergency room for further evaluation. Patient denies chest pain, palpitations, lightheadedness, dizziness, or syncope  Patient reports that she does have chronic shortness of breath with exertion as she has COPD. Patient does wear home oxygen. Patient states that she was surprised to learn she was in atrial fibrillation with RVR. She states that this is a new diagnosis for her. She denies symptoms of atrial fibrillation. On admission sodium 139, potassium 4.3, magnesium 1.5, TSH 0.793, creatinine 0.8. Patient was started on amiodarone for 100 mg twice daily this admission and Eliquis 5 mg twice daily. Patient also started on digoxin 125 mcg daily and Toprol-XL 50 mg daily.   Patient currently heart rate is not controlled  Blood pressure is soft  EF 55 to 60%  Patient on daily magnesium repletion at this time  Chest x-ray shows no pulmonary edema or pneumonia      Pastmedical history:   Past Medical History:   Diagnosis Date    COPD (chronic obstructive pulmonary disease) ([facility] [address]     H/O Doppler LOWER ARTERIAL ultrasound 11/06/2019    The Left CFA exhibits calcific plaquing with elevated PSV,  Right ABIs show Mild PAD. The Left AUDREY shows Moderate peripheral arterial disease. H/O echocardiogram 11/06/2019    EF 55-60%, Mild aortic stenosis with mean gradient of 13 mmHg. Hypertension     PAD (peripheral artery disease) Periph Angio([facility]) 02/11/2020     Severe left inflow stenosis of left common iliac and left common  femoral artery, will plan CSI atherectomy. Polycythemia     Respiratory failure, chronic ([facility])     Type II or unspecified type diabetes mellitus without mention of complication, not stated as uncontrolled        Surgical history :   Past Surgical History:   Procedure Laterality Date    GALLBLADDER SURGERY  1971       Family history:   Family History   Problem Relation Age of Onset    Diabetes Mother     Hypertension Mother     Diabetes Father     Hypertension Father     Heart Attack Father     Diabetes Sister     Hypertension Sister     Breast Cancer Sister     Diabetes Brother     Hypertension Brother     Heart Attack Brother     Cancer Paternal Grandmother        Social history :  reports that she quit smoking about 13 years ago. Her smoking use included cigarettes. She has a 0.13 pack-year smoking history. She has never used smokeless tobacco. She reports that she does not drink alcohol and does not use drugs. No Known Allergies    No current facility-administered medications on file prior to encounter.      Current Outpatient Medications on File Prior to Encounter   Medication Sig Dispense Refill    metFORMIN (GLUCOPHAGE) 500 MG tablet Take 500 mg by mouth at bedtime Patient takes 500mg nightly at bedtime      fluticasone-vilanterol (BREO ELLIPTA) 100-25 MCG/INH AEPB inhaler Inhale 1 puff into the lungs daily 1 each 5    albuterol sulfate HFA (PROAIR HFA) 108 (90 Base) MCG/ACT inhaler Inhale 2 puffs into the lungs every 6 hours as needed for Wheezing 1 Inhaler 5    cilostazol (PLETAL) 50 MG tablet Take 1 tablet by mouth 2 times daily 60 tablet 3    LANTUS SOLOSTAR 100 UNIT/ML injection pen Inject 30 Units into the skin daily Patient takes 30 units subcutaneously in the morning      ipratropium-albuterol (DUONEB) 0.5-2.5 (3) MG/3ML SOLN nebulizer solution Inhale 3 mLs into the lungs every 6 hours as needed for Shortness of Breath. (Patient not taking: No sig reported) 120 vial 5    levothyroxine (SYNTHROID) 200 MCG tablet Take 200 mcg by mouth Daily      CRESTOR 40 MG tablet Take 40 mg by mouth daily       OXYGEN Inhale 2 L into the lungs continuous. Review of Systems:   Review of Systems   Constitutional:  Positive for fatigue. Negative for activity change, chills and fever. HENT:  Negative for congestion, ear pain and tinnitus. Eyes:  Negative for photophobia, pain and visual disturbance. Respiratory:  Positive for shortness of breath. Negative for cough, chest tightness and wheezing. Cardiovascular:  Positive for leg swelling. Negative for chest pain and palpitations. Gastrointestinal:  Negative for abdominal pain, blood in stool, constipation, diarrhea, nausea and vomiting. Endocrine: Negative for cold intolerance and heat intolerance. Genitourinary:  Negative for dysuria, flank pain and hematuria. Musculoskeletal:  Positive for arthralgias. Negative for back pain, myalgias and neck stiffness. Skin:  Negative for color change and rash. Allergic/Immunologic: Negative for food allergies. Neurological:  Negative for dizziness, light-headedness, numbness and headaches. Hematological:  Does not bruise/bleed easily. Psychiatric/Behavioral:  Negative for agitation, behavioral problems and confusion.           Examination:      Vitals: 10/31/22 0719 10/31/22 0800 10/31/22 0943 10/31/22 0951   BP:  (!) 98/59 (!) 108/53    Pulse: (!) 109 99 (!) 107 (!) 107   Resp: 20 25     Temp:  97.3 °F (36.3 °C)     TempSrc:  Oral     SpO2: 94% 93%     Weight:       Height:            Body mass index is 50.95 kg/m². Physical Exam  Constitutional:       General: She is not in acute distress. Appearance: She is obese. She is not ill-appearing or diaphoretic. HENT:      Head: Normocephalic and atraumatic. Right Ear: External ear normal.      Left Ear: External ear normal.      Nose: No congestion. Mouth/Throat:      Mouth: Mucous membranes are moist.   Eyes:      Extraocular Movements: Extraocular movements intact. Conjunctiva/sclera: Conjunctivae normal.      Pupils: Pupils are equal, round, and reactive to light. Cardiovascular:      Rate and Rhythm: Tachycardia present. Rhythm irregular. Heart sounds: No murmur heard. Comments: distant heart sounds because of body habitus  Pulmonary:      Effort: Pulmonary effort is normal. No respiratory distress. Breath sounds: Normal breath sounds. No wheezing or rhonchi. Abdominal:      General: Abdomen is flat. Bowel sounds are normal. There is no distension. Palpations: Abdomen is soft. Tenderness: There is no abdominal tenderness. Musculoskeletal:         General: No tenderness. Cervical back: Normal range of motion. No tenderness. Right lower leg: Edema present. Left lower leg: Edema present. Skin:     General: Skin is warm. Neurological:      General: No focal deficit present. Mental Status: She is alert and oriented to person, place, and time. Cranial Nerves: No cranial nerve deficit.    Psychiatric:         Mood and Affect: Mood normal.             CBC:   Lab Results   Component Value Date/Time    WBC 7.3 10/29/2022 05:35 PM    HGB 12.9 10/29/2022 05:35 PM    HCT 41.3 10/29/2022 05:35 PM     10/29/2022 05:35 PM     Lipids:  Lab Results   Component Value Date    CHOL 111 10/22/2013    TRIG 183 (H) 10/22/2013    HDL 39 (L) 10/22/2013    LDLCALC 35 10/22/2013    LDLDIRECT 59 01/23/2013     PT/INR:   Lab Results   Component Value Date/Time    INR 1.08 10/25/2022 07:54 AM        BMP:    Lab Results   Component Value Date     10/29/2022    K 3.8 10/29/2022    CL 91 (L) 10/29/2022    CO2 39 (H) 10/29/2022    BUN 14 10/29/2022     CMP:   Lab Results   Component Value Date    AST 15 10/24/2022    PROT 6.9 10/24/2022    BILITOT 0.7 10/24/2022    ALKPHOS 46 10/24/2022     TSH:  No results found for: TSH, T4    EKGINTERPRETATION - EKG Interpretation:        Vitals:    10/31/22 0719 10/31/22 0800 10/31/22 0943 10/31/22 0951   BP:  (!) 98/59 (!) 108/53    Pulse: (!) 109 99 (!) 107 (!) 107   Resp: 20 25     Temp:  97.3 °F (36.3 °C)     TempSrc:  Oral     SpO2: 94% 93%     Weight:       Height:              IMPRESSION / RECOMMENDATIONS:     Atrial fibrillation with RVR  Acute on chronic diastolic heart failure  Hypomagnesium  COPD  DM-2  HTN  PVD    Patient currently on amiodarone digoxin and Toprol-XL. He still not controlled. I think it is reasonable to consider SAMMY cardioversion on the patient and then discontinue digoxin on the patient. Also on long-term amiodarone. Probably this patient given her COPD but this time it was already started so we will continue with that. Patient also has acute on chronic diastolic heart failure which could be exacerbated by atrial fibrillation. Patient is on diuretics. Please keep the potassium level between 4-4.5 and mag level between 2-2.2. Discussed with Dr. Fidencio Tom and is planning for SAMMY cardioversion today. Recommend to continue anticoagulation - currently on eliquis        Thanks again for allowing me to participate in care of this patient. Please call me if you have any questions. With best regards.       Rosa Ortiz MD, 10/31/2022 12:06 PM     Please note this report has been partially produced using speech recognition software and may contain errors related to that system including errors in grammar, punctuation, and spelling, as well as words and phrases that may be inappropriate. If there are any questions or concerns please feel free to contact the dictating provider for clarification.

## 2022-10-31 NOTE — PROGRESS NOTES
V2.0  [facility] Hospitalist Progress Note      Name:  Alexandria Jacques /Age/Sex: 1945  (67 y.o. female)   MRN & CSN:  [id number] & [id number] Encounter Date/Time: 10/31/2022 2:29 PM EDT    Location:  -A PCP: Mindi Simms MD       Hospital Day: 8      Subjective:     Chief Complaint: Irregular Heart Beat (Atrial fib)     Patient still tachy 's. BP stable was in the 100-130's. Pt going for cardioversion and SAMMY today. Denies lightheadedness, dizziness, fever, night sweats, chills, chest pain, cough, dyspnea, palpitations, abd pain, nausea, vomiting, diarrhea, dysuria. Assessment and Plan:   Alexandria Jacques is a 67 y.o. female with pmh of COPD (2L), CHF, DM II, PVD, PRISCA who presents with Atrial fibrillation with rapid ventricular response ([address]      Plan:  Atrial fibrillation with rapid ventricular response - improved  Still tachy 's. Likely in the setting of volume overload and history of COPD and PRISCA. chadsvasc score 5.  -per cardiology, continue Toprol, and to continue with digoxin and amidarone  - EP consulted by cardiology  - continue Eliquis   - follow up cardiology recs  -Telemetry monitoring  - plan for SAMMY/cardioversion today    Acute on chronic diastolic heart failure - improving. ECHO 10/24/2022 EF 55-60%, moderately dilated RV. CXR clear. Received IV diuretics   -diuretics changed to Torsemide 40 mg by cardiology  -Intake and output monitoring-Daily weights     Hypomagnesemia  Replace as needed        Diabetes Mellitus type II-  On home inslulin and oral meds. HA1C 5.8 10/24/2022. Glucose stable. - SSI  - endo following      Chronic respiratory failure COPD. On 2L NC at home. Not in exacerbation.  -Continue home oxygen  -Continue home MDI  -Continuous pulse ox     Peripheral vascular disease: Hx of left common iliac and left common femoral stenosis per angiogram in .   She was to follow-up to have a CSI arthrectomy but failed to follow-up for procedure as COVID-19 shutdown any procedures and was canceled. -Continue Pletal   - outpatient follow up    Essential Hypertension- BP stable today 100's. - hold home lisinopril for now      Diet Diet NPO   DVT Prophylaxis [] Lovenox, []  Heparin, [] SCDs, [] Ambulation,  [x] Eliquis, [] Xarelto  [] Coumadin   Code Status Full Code   Disposition From: home  Expected Disposition: home with Ana Mcfarlane  Estimated Date of Discharge: likely 11/1  Patient requires continued admission due plan for SAMMY/cardioversion today, pt to stay for the night post procedure for observation per cardiology   Surrogate Decision Maker/ POA      Review of Systems:    Review of Systems    See above    Objective: Intake/Output Summary (Last 24 hours) at 10/31/2022 1238  Last data filed at 10/31/2022 0650  Gross per 24 hour   Intake 690 ml   Output --   Net 690 ml          Vitals:   Vitals:    10/31/22 0951   BP:    Pulse: (!) 107   Resp:    Temp:    SpO2:        Physical Exam:     General: NAD  Eyes: EOMI  ENT: neck supple  Cardiovascular: irregular rate and rhythm  Respiratory: bilateral mild basilar rales  Gastrointestinal: Soft, non tender  Genitourinary: no suprapubic tenderness  Musculoskeletal: trace bilateral edema  Skin: warm, dry  Neuro: Alert. Psych: Mood appropriate.      Medications:   Medications:    metoprolol succinate  50 mg Oral Daily    insulin lispro  0-8 Units SubCUTAneous 2 times per day    torsemide  40 mg Oral Daily    amiodarone  400 mg Oral BID    magnesium oxide  400 mg Oral Daily    digoxin  125 mcg Oral Daily    glipiZIDE  10 mg Oral BID AC    apixaban  5 mg Oral BID    cilostazol  50 mg Oral BID    rosuvastatin  40 mg Oral Daily    budesonide-formoterol  2 puff Inhalation BID    insulin glargine  30 Units SubCUTAneous Daily    levothyroxine  200 mcg Oral Daily    [Held by provider] lisinopril  10 mg Oral Daily    sodium chloride flush  5-40 mL IntraVENous 2 times per day    insulin lispro  0-8 Units SubCUTAneous TID WC      Infusions:    dextrose      sodium chloride       PRN Meds: albuterol sulfate HFA, 2 puff, Q6H PRN  glucose, 4 tablet, PRN  dextrose bolus, 125 mL, PRN   Or  dextrose bolus, 250 mL, PRN  glucagon (rDNA), 1 mg, PRN  dextrose, , Continuous PRN  sodium chloride flush, 5-40 mL, PRN  sodium chloride, , PRN  ondansetron, 4 mg, Q8H PRN   Or  ondansetron, 4 mg, Q6H PRN  polyethylene glycol, 17 g, Daily PRN  acetaminophen, 650 mg, Q6H PRN   Or  acetaminophen, 650 mg, Q6H PRN      Labs      Recent Results (from the past 24 hour(s))   POCT Glucose    Collection Time: 10/30/22  4:06 PM   Result Value Ref Range    POC Glucose 171 (H) 70 - 99 MG/DL   POCT Glucose    Collection Time: 10/30/22  9:11 PM   Result Value Ref Range    POC Glucose 162 (H) 70 - 99 MG/DL   POCT Glucose    Collection Time: 10/31/22  2:20 AM   Result Value Ref Range    POC Glucose 149 (H) 70 - 99 MG/DL   POCT Glucose    Collection Time: 10/31/22  6:22 AM   Result Value Ref Range    POC Glucose 159 (H) 70 - 99 MG/DL        Imaging/Diagnostics Last 24 Hours   XR CHEST PORTABLE    Result Date: 10/25/2022  EXAMINATION: ONE XRAY VIEW OF THE CHEST 10/24/2022 3:26 pm COMPARISON: 2/10/2020 HISTORY: ORDERING SYSTEM PROVIDED HISTORY: sob TECHNOLOGIST PROVIDED HISTORY: Reason for exam:->sob Reason for Exam: sob FINDINGS: The heart size is enlarged. There is no pneumothorax, edema or focal consolidation. The bones are demineralized. Patient body habitus limits the exam.     Mild cardiac silhouette enlargement. No evidence of edema or pneumonia.        Electronically signed by Gisela Garcia MD on 10/31/2022 at 12:38 PM

## 2022-10-31 NOTE — PROGRESS NOTES
Comprehensive Nutrition Assessment    Type and Reason for Visit:  Initial (LOS)    Nutrition Recommendation:   Continue current diet as ordered  Please document all po intake  Will monitor po intake, weight trends, poc     Malnutrition Assessment:  Malnutrition Status:  No malnutrition (10/31/22 8752)    Context:  Acute Illness       Nutrition Assessment:    Pt admitted for hypomagnesemia, A fib w/ RVR, PMH: DM, COPD, PAD, HTN, pt currently on 4 carb/low sodium/2000 ml fluid restricted diet consuming % of meals documented, no wounds noted, no significant wt loss noted per chart review, will continue to follow at moderate nutrition risk    Nutrition Related Findings:    POC glucose 146, 159, 149 Wound Type: None       Current Nutrition Intake & Therapies:    Average Meal Intake: 51-75%, %  Average Supplements Intake: None Ordered  ADULT DIET; Regular; 4 carb choices (60 gm/meal); Low Sodium (2 gm); 2000 ml    Anthropometric Measures:  Height: 5' 7.99\" (172.7 cm)  Ideal Body Weight (IBW): 140 lbs (64 kg)       Current Body Weight: 335 lb 1.6 oz (152 kg), 239.4 % IBW.  Weight Source: Bed Scale  Current BMI (kg/m2): 51        Weight Adjustment For: No Adjustment                 BMI Categories: Obese Class 2 (BMI 35.0 -39.9)    Estimated Daily Nutrient Needs:  Energy Requirements Based On: Formula  Weight Used for Energy Requirements: Current  Energy (kcal/day): 0882-3186 ([facility] w/ stress factor 1.0-1.2)  Weight Used for Protein Requirements: Ideal  Protein (g/day): 64-77 (1.0-1.2 g/kg)  Method Used for Fluid Requirements: 1 ml/kcal  Fluid (ml/day): 2000 (ordered fluid restriction)    Nutrition Diagnosis:   Inadequate oral intake related to acute injury/trauma as evidenced by  (varied po intake)    Nutrition Interventions:   Food and/or Nutrient Delivery: Continue Current Diet  Nutrition Education/Counseling: No recommendation at this time  Coordination of Nutrition Care: Continue to monitor while inpatient       Goals:  Previous Goal Met: Progressing toward Goal(s)  Goals: PO intake 75% or greater, by next RD assessment       Nutrition Monitoring and Evaluation:   Behavioral-Environmental Outcomes: None Identified  Food/Nutrient Intake Outcomes: Food and Nutrient Intake  Physical Signs/Symptoms Outcomes: Biochemical Data, GI Status, Weight, Meal Time Behavior, Hemodynamic Status, Fluid Status or Edema    Discharge Planning:     Too soon to determine     Addi Martins [address], [address],   Contact: [phone]

## 2022-10-31 NOTE — OP NOTE
Operative Note                                                                                             ZACH ROWAN MD  FAC                                                                                         Dipl CBNC, CBCCT, NBE, RPVI                                                                                                    CARDIOVESION      Indication: Atrial fibrillation    ASA 2   Malapati 2    After obtaining the informed consent pt was sedated  With   Versed   . A    200      J synchronised  shock was given. Pt converted to sinus rhythm        Pt remained clinically and hemodynamically stable. On Eliquis        Cont with present medical therapy.       I:  Successful cardioversion    Plan:  Cont present therapy   Reduce amio to 200 twice daily after loading for 7 days per EP

## 2022-10-31 NOTE — PROGRESS NOTES
Progress Note( Dr. Carlton Munoz)  10/31/2022  Subjective:   Admit Date: 10/24/2022  PCP: Sri De Jesus MD    Admitted For : Atrial fibrillation  with rapid ventricular respons was noted by cardiologist on patient's office visit to him    Consulted For: Better control of blood glucose    Interval History: Patient feels lot better this morning he has atrial fibrillation but rate seem to be as slower  Cardiology decided against cardioversion this time  Stated will try with high-dose amiodarone , metoprolol and digoxin and discontinue Cardizem    Denies any chest pains,   Denies SOB . Denies nausea or vomiting. No new bowel or bladder symptoms. Intake/Output Summary (Last 24 hours) at 10/31/2022 0841  Last data filed at 10/31/2022 0650  Gross per 24 hour   Intake 690 ml   Output --   Net 690 ml         DATA    CBC:   Recent Labs     10/29/22  1735   WBC 7.3   HGB 12.9         CMP:  Recent Labs     10/29/22  1735      K 3.8   CL 91*   CO2 39*   BUN 14   CREATININE 0.9   CALCIUM 8.9       Lipids:   Lab Results   Component Value Date/Time    CHOL 111 10/22/2013 09:59 AM    HDL 39 10/22/2013 09:59 AM    TRIG 183 10/22/2013 09:59 AM     Glucose:  Recent Labs     10/30/22  2111 10/31/22  0220 10/31/22  0622   POCGLU 162* 149* 159*       [id number]:  Lab Results   Component Value Date/Time    LABA1C 5.8 10/24/2022 05:05 PM     High Sensitivity TSH:   Lab Results   Component Value Date/Time    TSHHS 0.793 10/24/2022 10:16 PM     Free T3: No results found for: FT3  Free T4:  Lab Results   Component Value Date/Time    T4FREE 1.75 10/24/2022 10:16 PM       XR CHEST PORTABLE   Final Result   Mild cardiac silhouette enlargement. No evidence of edema or pneumonia.               Scheduled Medicines   Medications:    metoprolol succinate  50 mg Oral Daily    insulin lispro  0-8 Units SubCUTAneous 2 times per day    torsemide  40 mg Oral Daily    amiodarone  400 mg Oral BID    magnesium oxide  400 mg Oral Daily digoxin  125 mcg Oral Daily    glipiZIDE  10 mg Oral BID AC    apixaban  5 mg Oral BID    cilostazol  50 mg Oral BID    rosuvastatin  40 mg Oral Daily    budesonide-formoterol  2 puff Inhalation BID    insulin glargine  30 Units SubCUTAneous Daily    levothyroxine  200 mcg Oral Daily    [Held by provider] lisinopril  10 mg Oral Daily    sodium chloride flush  5-40 mL IntraVENous 2 times per day    insulin lispro  0-8 Units SubCUTAneous TID WC      Infusions:    dextrose      sodium chloride           Objective:   Vitals: BP (!) 131/58   Pulse (!) 109   Temp 98.6 °F (37 °C) (Oral)   Resp 29   Ht 5' 8\" (1.727 m)   Wt (!) 335 lb 1.6 oz (152 kg)   SpO2 94%   BMI 50.95 kg/m²   General appearance: alert and cooperative with exam  Neck: no JVD or bruit  Thyroid : Normal lobes   Lungs: Has Vesicular Breath sounds home oxygen 24/7  Heart:  Atrial fibrillation   Abdomen: soft, non-tender; bowel sounds normal; no masses,  no organomegaly  Musculoskeletal: Normal  Extremities: extremities normal, , no edema  Neurologic:  Awake, alert, oriented to name, place and time. Cranial nerves II-XII are grossly intact. Motor is  intact. Sensory i neuropathy. ,  and gait is normal.    Assessment:     Patient Active Problem List:     Chronic respiratory failure (HCC)     COPD, mild (HCC)     PAD (peripheral artery disease) (HCC)     SOB (shortness of breath)     Mixed hyperlipidemia     New onset a-fib ([facility]     Class 3 severe obesity due to excess calories with serious comorbidity and body mass index (BMI) of 50.0 to 59.9 in adult [facility])     Primary hypertension     Atrial fibrillation with RVR ([facility]      Plan:     Reviewed POC blood glucose .  Labs and X ray results   Reviewed Current Medicines   On meal/ Correction bolus Humalog/ Basal Lantus Insulin regime / and Oral Hypoglycemic drugs   Monitor Blood glucose frequently   Modified  the dose of Insulin/ other medicines as needed   Reviewed notes of cardiology and hospitalist  Now plan is to cardiovert tomorrow. Consulting EP  Waiting for EP input  Will follow     .      Marcos Lim MD, MD

## 2022-11-01 NOTE — PROGRESS NOTES
Physical Therapy  [facility] ACUTE CARE PHYSICAL THERAPY EVALUATION  Guillaume Covarrubias, 1945, 2011/2011-A, 11/1/2022    History  [place]:  The primary encounter diagnosis was Atrial fibrillation with rapid ventricular response ([address] Diagnoses of Hypervolemia, unspecified hypervolemia type, Hypomagnesemia, and Atrial fibrillation with RVR ([address] were also pertinent to this visit. Patient  has a past medical history of COPD (chronic obstructive pulmonary disease) ([address] H/O Doppler LOWER ARTERIAL ultrasound, H/O echocardiogram, Hypertension, PAD (peripheral artery disease) Periph Angio(HCC), Polycythemia, Respiratory failure, chronic ([address] and Type II or unspecified type diabetes mellitus without mention of complication, not stated as uncontrolled. Patient  has a past surgical history that includes Gallbladder surgery (1971). Assessment:  Pt presents 8 days s/p admission w/ SOB, BL LE swelling, a fib w/ RVR. She is from home alone, in 1 level home w/ 2 HARPAL, ind PLOF, no AD. Pt presents primarily impaired by strength and endurance, additional deficits include impaired balance, safety awareness, functional mobility. She is requiring only steadying assist for stairs, consistent/steady OOB performance, impaired safety awareness, operating near baseline. Recommending home w/ assist + HH PT. Complexity: Moderate  Prognosis: Good, comorbid conditions, hx of falls + fear of falling  Plan 3+/week, 1 week  Equipment: potential walker - will cont to discuss w/ pt    Recommendations for NURSING mobility: amb to and from BR + halls    Subjective:  Patient states:  \"I thought that went better than expected. \"  Pain:  denies.     Communication with other providers:  Handoff to RN, co-eval with Brian Gonzalez  Restrictions: fall risk; general precautions    Home Setup/Prior level of function  Social/Functional History  Lives With: Alone (daughter and two sons live on the same farm)  Type of Home: [facility] Layout: One level  Home Access: Stairs to enter without rails  Entrance Stairs - Number of Steps: 2  Bathroom Shower/Tub: Walk-in shower  Bathroom Toilet: Standard  Bathroom Equipment: Built-in shower seat, Grab bars in shower  Home Equipment: Jade Annelise, rolling, Walker, standard, Cane, Oxygen, Wheelchair-manual, Lift chair  Has the patient had two or more falls in the past year or any fall with injury in the past year?: No  ADL Assistance: Independent  Homemaking Assistance: Independent  Ambulation Assistance: Independent (w/o AD)  Transfer Assistance: Independent  Active : Yes    Examination of body systems (includes body structures/functions, activity/participation limitations):  Observation:  Seated in recliner, awake, alert. She is moving well, limited by endurance and anxiousness 2/2 fear of falls. Tele, BP cuff, 2 L of nc O2 in place upon arrival  Posture: fair + ; fwd head and shoulders  Vision:  glasses  Hearing:  WFL  Cardiopulmonary:  WFL ; on 2 L of nc O2, HR remains WFL during OOB  Cognition: A&O x 4 ; alert, follows commands w/o difficulty, min cues intermittently for redirection, memory appears intact, fair + safety awareness, min cues needed for sequencing/setup, min cues needed for initiation, good insight into deficits    Musculoskeletal  ROM R/L: AROM WFL as observed in function    Strength R/L:  grossly >3/5, as observed in function and ROM. Sensation: WFL  Tone: normotonic  Coordination: WFL    Mobility:  Supine to sit:  NT ; received in recliner  Transfers: SBA from recliner and toilet  Sitting balance:  good. Standing balance:  fair ; reliant on some UE support for steady.     Gait: 10 ft + 10 ft + 75 ft + 75 ft using FWW at [facility] progressing to SBA ; dec shoaib, waddling quality, inc UE support, limited by endurance and O2 demands, dec BL LE clearance  Stairs: 2 steps at [facility] w/ UE support    [facility] 6 Clicks Inpatient Mobility:  AM-PAC Inpatient Mobility Raw Score : 18    Goals:  Pt Goals: return home  Short Term Goals  Time Frame for Short Term Goals: 1 week  Short Term Goal 1: pt will complete bed mobility at mod ind  Short Term Goal 2: pt will complete transfers at mod ind  Short Term Goal 3: pt will ambulate 150 ft using FWW at mod ind  Short Term Goal 4: pt will ascend/decscend 2 HARPAL at SBA for safe home entry/exit       Treatment plan:  Bed mobility, functional mobility, transfers, balance, gait, TA, TX, strength activities, neuro    Treatment:  Therapeutic Activity Training:   Therapeutic activity training was instructed today. Cues were given for safety, sequence, UE/LE placement, awareness, and balance. Activities performed today included bed mobility training, sup-sit, sit-stand, SPT. Gait Training:  Cues were given for safety, sequence, device management, balance, posture, awareness, path.       Positioned for comfort and pressure relief  Safety: patient left in chair, call light within reach, left in care of OT, gait belt used    Time:   Time in: 1353  Time out: 1435  Timed treatment minutes: 33  Total time + Eval: 43    Electronically signed by:    Calvin Rao PT, DPT  11/1/2022, 2:43 PM

## 2022-11-01 NOTE — PROGRESS NOTES
Occupational Therapy Treatment Note    Name: Caitlin Perez MRN: [id number] :   1945   Date:  2022   Admission Date: 10/24/2022 Room:  -A     Primary Problem:  The primary encounter diagnosis was Atrial fibrillation with rapid ventricular response ([facility] [address] [address] Diagnoses of Hypervolemia, unspecified hypervolemia type, Hypomagnesemia, and Atrial fibrillation with RVR ([facility] [address] were also pertinent to this visit. Restrictions/Precautions:  General Precautions, Fall Risk    Communication with other providers: Per chart review and Nurse patient is appropriate for therapeutic intervention. Co tx with [facility] PT due to safety for stair training and ambulation. Subjective:  Patient states:  Agreeable to OT therapy  Pain:   Location, Type, Intensity (0/10 to 10/10):  deny    Objective:    Observation: Just started session with PT  Objective Measures:  Alert and oriented    Treatment, including education:  Self Care Training:   Cues were given for safety, sequence, UE/LE placement, visual cues, and balance. Activities performed today included dressing, toileting, personal hygiene, and grooming task. Demo Min A for [facility] gown in seated position. Completed toilet hygiene SBA for safety utilizing RW. Completed SUP UB sponge bathing/ personal hygiene/grooming task in seated position due to fatigue. Require increase time to complete task due to fatigue and easily distracted with story telling. Therapeutic Activity Training:   Therapeutic activity training was instructed today. Cues were given for safety, sequence, UE/LE placement, awareness, and balance.       Sitting balance:G for dyn   Sit/stand transfers:SBA utilizing RW  Functional Mobility: CGA +1 chair follow +1 ~50' x2 reps; stair training with 2 steps CGA x2    Activities performed today included bed mobility training, transfers, functional mobility  to increase strength, activity tolerance to facilitate IND c ADL tasks, func transfers / mobility with G safety awareness carryover    Assessment / Impression:  Notable increase of fatigue with stair training and ambulation; require extended seated RB as PRN. Patient's tolerance of treatment: Well  Adverse Reaction: None  Significant change in status and impact: Improved from previous session  Barriers to improvement: activity tolerance and strength    Safety  Patient educated on role of OT , benefits of OT and rationale for therapeutic intervention. Patient safely in recliner + alarm set at end of session, with call light/phone in reach, and nursing aware. Gait belt was used for func transfers / mobility.     Plan for Next Session:    Continue OT POC    Time in:  1400   Time out:  1455  Timed treatment minutes:  55  Total treatment time:  55      Electronically signed by:    SEBAS Matt,   11/1/2022, 8:39 AM

## 2022-11-01 NOTE — PROGRESS NOTES
V2.0  [facility] [place]      Name:  Sveta Barker /Age/Sex: 1945  (67 y.o. female)   MRN & CSN:  [id number] & [id number] Encounter Date/Time: 2022 2:29 PM EDT    Location:  -A PCP: Mookie Fuentes MD       Hospital Day: 9      Subjective:     Chief Complaint: Irregular Heart Beat (Atrial fib)      Denies lightheadedness, dizziness, fever, night sweats, chills, chest pain, cough, dyspnea, palpitations, abd pain, nausea, vomiting, diarrhea, dysuria. Assessment and Plan:   Sveta Barker is a 67 y.o. female with pmh of COPD (2L), CHF, DM II, PVD, PRISCA who presents with Atrial fibrillation with rapid ventricular response (HCC)      Plan:  Atrial fibrillation with rapid ventricular response - improved  - s/p SAMMY/cardioversion   -Patient now in sinus rhythm, HR controlled  -per cardiology, continue Toprol, and amiodarone  - EP consulted by cardiology  - continue Eliquis   - follow up cardiology recs  -Telemetry monitoring      Acute on chronic diastolic heart failure - improving. ECHO 10/24/2022 EF 55-60%, moderately dilated RV. CXR clear. Received IV diuretics   -diuretics changed to Torsemide 40 mg by cardiology  -Intake and output monitoring-Daily weights     Hypomagnesemia  Replace as needed        Diabetes Mellitus type II-  On home inslulin and oral meds. HA1C 5.8 10/24/2022. Glucose stable. - SSI  - endo following      Chronic respiratory failure COPD. On 2L NC at home. Not in exacerbation.  -Continue home oxygen  -Continue home MDI  -Continuous pulse ox     Peripheral vascular disease: Hx of left common iliac and left common femoral stenosis per angiogram in . She was to follow-up to have a CSI arthrectomy but failed to follow-up for procedure as COVID-19 shutdown any procedures and was canceled. -Continue Pletal   - outpatient follow up    Essential Hypertension- BP stable today 100's.   - hold home lisinopril for now      Diet ADULT DIET; Regular; 4 carb choices (60 gm/meal); Low Sodium (2 gm); 2000 ml   DVT Prophylaxis [] Lovenox, []  Heparin, [] SCDs, [] Ambulation,  [x] Eliquis, [] Xarelto  [] Coumadin   Code Status Full Code   Disposition From: home  Expected Disposition: home with [facility]  Estimated Date of Discharge: likely 11/1  Patient requires continued admission due to pending clearance by cardiology   Surrogate Decision Maker/ POA      Review of Systems:    Review of Systems    See above    Objective: Intake/Output Summary (Last 24 hours) at 11/1/2022 1537  Last data filed at 11/1/2022 1026  Gross per 24 hour   Intake 380 ml   Output --   Net 380 ml            Vitals:   Vitals:    11/01/22 1500   BP:    Pulse: 58   Resp: (!) 34   Temp:    SpO2: 94%       Physical Exam:     General: NAD  Eyes: EOMI  ENT: neck supple  Cardiovascular: irregular rate and rhythm  Respiratory: bilateral mild basilar rales  Gastrointestinal: Soft, non tender  Genitourinary: no suprapubic tenderness  Musculoskeletal: trace bilateral edema  Skin: warm, dry  Neuro: Alert. Psych: Mood appropriate.      Medications:   Medications:    [START ON 11/3/2022] amiodarone  200 mg Oral BID    amiodarone  200 mg Oral BID    [START ON 11/2/2022] metoprolol succinate  25 mg Oral Daily    insulin lispro  0-8 Units SubCUTAneous 2 times per day    torsemide  40 mg Oral Daily    magnesium oxide  400 mg Oral Daily    glipiZIDE  10 mg Oral BID AC    apixaban  5 mg Oral BID    cilostazol  50 mg Oral BID    rosuvastatin  40 mg Oral Daily    budesonide-formoterol  2 puff Inhalation BID    insulin glargine  30 Units SubCUTAneous Daily    levothyroxine  200 mcg Oral Daily    sodium chloride flush  5-40 mL IntraVENous 2 times per day    insulin lispro  0-8 Units SubCUTAneous TID WC      Infusions:    dextrose      sodium chloride       PRN Meds: albuterol sulfate HFA, 2 puff, Q6H PRN  glucose, 4 tablet, PRN  dextrose bolus, 125 mL, PRN   Or  dextrose bolus, 250 mL, PRN  glucagon (rDNA), 1 mg, PRN  dextrose, , Continuous PRN  sodium chloride flush, 5-40 mL, PRN  sodium chloride, , PRN  ondansetron, 4 mg, Q8H PRN   Or  ondansetron, 4 mg, Q6H PRN  polyethylene glycol, 17 g, Daily PRN  acetaminophen, 650 mg, Q6H PRN   Or  acetaminophen, 650 mg, Q6H PRN      Labs      Recent Results (from the past 24 hour(s))   POCT Glucose    Collection Time: 10/31/22  4:31 PM   Result Value Ref Range    POC Glucose 146 (H) 70 - 99 MG/DL   POCT Glucose    Collection Time: 10/31/22  8:24 PM   Result Value Ref Range    POC Glucose 352 (H) 70 - 99 MG/DL   POCT Glucose    Collection Time: 10/31/22 10:02 PM   Result Value Ref Range    POC Glucose 571 (H) 70 - 99 MG/DL   POCT Glucose    Collection Time: 10/31/22 11:30 PM   Result Value Ref Range    POC Glucose 235 (H) 70 - 99 MG/DL   POCT Glucose    Collection Time: 11/01/22  2:01 AM   Result Value Ref Range    POC Glucose 178 (H) 70 - 99 MG/DL   POCT Glucose    Collection Time: 11/01/22  6:08 AM   Result Value Ref Range    POC Glucose 129 (H) 70 - 99 MG/DL   POCT Glucose    Collection Time: 11/01/22 11:54 AM   Result Value Ref Range    POC Glucose 188 (H) 70 - 99 MG/DL        Imaging/Diagnostics Last 24 Hours   XR CHEST PORTABLE    Result Date: 10/25/2022  EXAMINATION: ONE XRAY VIEW OF THE CHEST 10/24/2022 3:26 pm COMPARISON: 2/10/2020 HISTORY: ORDERING SYSTEM PROVIDED HISTORY: sob TECHNOLOGIST PROVIDED HISTORY: Reason for exam:->sob Reason for Exam: sob FINDINGS: The heart size is enlarged. There is no pneumothorax, edema or focal consolidation. The bones are demineralized. Patient body habitus limits the exam.     Mild cardiac silhouette enlargement. No evidence of edema or pneumonia.        Electronically signed by April Still MD on 11/1/2022 at 3:37 PM

## 2022-11-01 NOTE — PROGRESS NOTES
Progress Note( Dr. Caryle Cornwall)  11/1/2022  Subjective:   Admit Date: 10/24/2022  PCP: Elbert Macias MD    Admitted For : Atrial fibrillation  with rapid ventricular respons was noted by cardiologist on patient's office visit to him    Consulted For: Better control of blood glucose        Interval History: Patient feels lot better this morning he has atrial fibrillation but rate seem to be as slower  Cardiology Decided to go aheaad with Cardioversion That was done yesterday with SAMMY  Now Pt ios in Sinus rhythm  Heat rate is slow < 60     Had very harjeet blood glucose yesterday ? Did not get her Insulin . Denies any chest pains,   Denies SOB . Denies nausea or vomiting. No new bowel or bladder symptoms. Intake/Output Summary (Last 24 hours) at 11/1/2022 0844  Last data filed at 11/1/2022 0613  Gross per 24 hour   Intake 180 ml   Output --   Net 180 ml         DATA    CBC:   Recent Labs     10/29/22  1735   WBC 7.3   HGB 12.9         CMP:  Recent Labs     10/29/22  1735      K 3.8   CL 91*   CO2 39*   BUN 14   CREATININE 0.9   CALCIUM 8.9       Lipids:   Lab Results   Component Value Date/Time    CHOL 111 10/22/2013 09:59 AM    HDL 39 10/22/2013 09:59 AM    TRIG 183 10/22/2013 09:59 AM     Glucose:  Recent Labs     10/31/22  2330 11/01/22  0201 11/01/22  0608   POCGLU 235* 178* 129*       [id number]:  Lab Results   Component Value Date/Time    LABA1C 5.8 10/24/2022 05:05 PM     High Sensitivity TSH:   Lab Results   Component Value Date/Time    TSHHS 0.793 10/24/2022 10:16 PM     Free T3: No results found for: FT3  Free T4:  Lab Results   Component Value Date/Time    T4FREE 1.75 10/24/2022 10:16 PM       XR CHEST PORTABLE   Final Result   Mild cardiac silhouette enlargement. No evidence of edema or pneumonia.               Scheduled Medicines   Medications:    metoprolol succinate  50 mg Oral Daily    insulin lispro  0-8 Units SubCUTAneous 2 times per day    torsemide  40 mg Oral Daily amiodarone  400 mg Oral BID    magnesium oxide  400 mg Oral Daily    digoxin  125 mcg Oral Daily    glipiZIDE  10 mg Oral BID AC    apixaban  5 mg Oral BID    cilostazol  50 mg Oral BID    rosuvastatin  40 mg Oral Daily    budesonide-formoterol  2 puff Inhalation BID    insulin glargine  30 Units SubCUTAneous Daily    levothyroxine  200 mcg Oral Daily    [Held by provider] lisinopril  10 mg Oral Daily    sodium chloride flush  5-40 mL IntraVENous 2 times per day    insulin lispro  0-8 Units SubCUTAneous TID WC      Infusions:    dextrose      sodium chloride           Objective:   Vitals: /72   Pulse 62   Temp 97.7 °F (36.5 °C) (Oral)   Resp 28   Ht 5' 7.99\" (1.727 m)   Wt (!) 343 lb 14.7 oz (156 kg)   SpO2 96%   BMI 52.30 kg/m²   General appearance: alert and cooperative with exam  Neck: no JVD or bruit  Thyroid : Normal lobes   Lungs: Has Vesicular Breath sounds home oxygen 24/7  Heart:  Atrial fibrillation   Abdomen: soft, non-tender; bowel sounds normal; no masses,  no organomegaly  Musculoskeletal: Normal  Extremities: extremities normal, , no edema  Neurologic:  Awake, alert, oriented to name, place and time. Cranial nerves II-XII are grossly intact. Motor is  intact. Sensory i neuropathy. ,  and gait is normal.    Assessment:     Patient Active Problem List:     Chronic respiratory failure (HCC)     COPD, mild (HCC)     PAD (peripheral artery disease) (HCC)     SOB (shortness of breath)     Mixed hyperlipidemia     New onset a-fib ([facility] [address]     Class 3 severe obesity due to excess calories with serious comorbidity and body mass index (BMI) of 50.0 to 59.9 in adult [facility])     Primary hypertension     Atrial fibrillation with RVR ([facility] [address]      Plan:     Reviewed POC blood glucose .  Labs and X ray results   Reviewed Current Medicines   On meal/ Correction bolus Humalog/ Basal Lantus Insulin regime / and Oral Hypoglycemic drugs   Monitor Blood glucose frequently   Modified  the dose of Insulin/ other medicines as needed   Reviewed notes of cardiology and hospitalist  Will follow     .      Cindy Pires MD, MD

## 2022-11-01 NOTE — CARE COORDINATION
PT wolfgang again recommending home w/HHC and home PT/OT/ HH is following for needs.  Suzi Bridges RN

## 2022-11-01 NOTE — PROGRESS NOTES
Cardiology Progress Note     Today's Plan: get BMP and mag. Decrease amiodarone to 200 mg BID, Toprol to 25 mg, and stop digoxin. Admit Date:  10/24/2022    Consult reason/ Seen today for: atrial fib with RVR    Subjective and  Overnight Events: Patient eager to be discharged. Assessment / Plan:   Atrial fib RVR- Successful SAMMY/cardioversion yesterday, she remains in NSR. EP recommends to continue with amiodarone 400 mg twice daily x 7 days then 200 mg BID, digoxin 125 mcg, Toprol XL 50 mg daily. Bigeminy PVC's-check magnesium and BMP. Decrease amiodarone to 200 mg BID, Toprol to 25 mg, and stop digoxin. HFpEF- acute on chronic- shortness of breath improving: on torsemide. BLE present. Undocumented output. Normal BNP. PVD-noted to have Left common iliac:severe calcified stenosis at the ostium 80% and  left common femoral artery 80% calcified stenosis: was planned for atherecoty howerver was cancelled: omer stable- on pletal- to follow up as outpatient   Hypertension: BP soft - will D/C lisinopril. Morbid obesity: Bmi 50.94-       History of Presenting Illness:    Chief complain on admission : 67 y. o.year old who is admitted for  Chief Complaint   Patient presents with    Irregular Heart Beat     Atrial fib        Past medical history:    has a past medical history of COPD (chronic obstructive pulmonary disease) ([address] H/O Doppler LOWER ARTERIAL ultrasound, H/O echocardiogram, Hypertension, PAD (peripheral artery disease) Periph Angio(HCC), Polycythemia, Respiratory failure, chronic ([address] and Type II or unspecified type diabetes mellitus without mention of complication, not stated as uncontrolled. Past surgical history:   has a past surgical history that includes Gallbladder surgery (1971). Social History:   reports that she quit smoking about 13 years ago. Her smoking use included cigarettes. She has a 0.13 pack-year smoking history.  She has never used smokeless tobacco. She reports that she does not drink alcohol and does not use drugs. Family history:  family history includes Breast Cancer in her sister; Cancer in her paternal grandmother; Diabetes in her brother, father, mother, and sister; Heart Attack in her brother and father; Hypertension in her brother, father, mother, and sister. No Known Allergies    Review of Systems:  Review of Systems   Respiratory:  Negative for shortness of breath. Cardiovascular:  Positive for leg swelling. Negative for chest pain and palpitations. Musculoskeletal: Negative. Skin: Negative. Neurological:  Negative for dizziness and weakness. All other systems reviewed and are negative. /72   Pulse 62   Temp 97.7 °F (36.5 °C) (Oral)   Resp 28   Ht 5' 7.99\" (1.727 m)   Wt (!) 343 lb 14.7 oz (156 kg)   SpO2 96%   BMI 52.30 kg/m²     Intake/Output Summary (Last 24 hours) at 11/1/2022 0854  Last data filed at 11/1/2022 7096  Gross per 24 hour   Intake 180 ml   Output --   Net 180 ml         Physical Exam:  Physical Exam  Vitals and nursing note reviewed. Constitutional:       Appearance: She is well-developed. She is obese. HENT:      Head: Normocephalic. Mouth/Throat:      Mouth: Mucous membranes are moist.   Eyes:      Extraocular Movements: Extraocular movements intact. Cardiovascular:      Rate and Rhythm: Normal rate and regular rhythm. Pulses: Intact distal pulses. Dorsalis pedis pulses are 2+ on the right side and 2+ on the left side. Posterior tibial pulses are 2+ on the right side and 2+ on the left side. Heart sounds: Normal heart sounds, S1 normal and S2 normal. No murmur heard. Pulmonary:      Effort: Pulmonary effort is normal.      Breath sounds: Normal breath sounds. No rales. Musculoskeletal:         General: No tenderness. Normal range of motion. Right lower leg: Edema present. Left lower leg: Edema present.    Skin: General: Skin is warm and dry. Capillary Refill: Capillary refill takes less than 2 seconds. Neurological:      Mental Status: She is alert and oriented to person, place, and time. Medications:    metoprolol succinate  50 mg Oral Daily    insulin lispro  0-8 Units SubCUTAneous 2 times per day    torsemide  40 mg Oral Daily    amiodarone  400 mg Oral BID    magnesium oxide  400 mg Oral Daily    digoxin  125 mcg Oral Daily    glipiZIDE  10 mg Oral BID AC    apixaban  5 mg Oral BID    cilostazol  50 mg Oral BID    rosuvastatin  40 mg Oral Daily    budesonide-formoterol  2 puff Inhalation BID    insulin glargine  30 Units SubCUTAneous Daily    levothyroxine  200 mcg Oral Daily    [Held by provider] lisinopril  10 mg Oral Daily    sodium chloride flush  5-40 mL IntraVENous 2 times per day    insulin lispro  0-8 Units SubCUTAneous TID WC      dextrose      sodium chloride       albuterol sulfate HFA, glucose, dextrose bolus **OR** dextrose bolus, glucagon (rDNA), dextrose, sodium chloride flush, sodium chloride, ondansetron **OR** ondansetron, polyethylene glycol, acetaminophen **OR** acetaminophen    Lab Data:  CBC:   Recent Labs     10/29/22  1735   WBC 7.3   HGB 12.9   HCT 41.3   MCV 92.6          BMP:   Recent Labs     10/29/22  1735      K 3.8   CL 91*   CO2 39*   BUN 14   CREATININE 0.9       PT/INR: No results for input(s): PROTIME, INR in the last 72 hours. BNP:  No results for input(s): PROBNP in the last 72 hours. TROPONIN: No results for input(s): TROPONINT in the last 72 hours. Impression:  Principal Problem:    Atrial fibrillation with rapid ventricular response (HCC)  Resolved Problems:    * No resolved hospital problems. *       All labs, medications and tests reviewed by myself, continue all other medications of all above medical condition listed as is except for changes mentioned above. Thank you   Please call with questions.     Electronically signed by Guru Cheney. Kurt Mortimer, APRN - CNP on 2022 at 8:54 AM    I have seen ,spoken to  and examined this patient personally, independently of the CATHY. I have reviewed the hospital care given to date and reviewed all pertinent labs and imaging. I have spoken with patient, nursing staff and provided written and verbal instructions . The above note has been reviewed     CARDIOLOGY ATTENDING ADDENDUM    HPI:  I have reviewed the above HPI  And agree with above     Pulse Range: Pulse  Av.1  Min: 50  Max: 98    Blood Presuure Range:  Systolic (01AFZ), TDH:641 , Min:105 , FBX:579   ; Diastolic (26UOB), LZN:91, Min:47, Max:80      Pulse ox Range: SpO2  Av %  Min: 94 %  Max: 97 %    24hr I & O:    Intake/Output Summary (Last 24 hours) at 2022 1642  Last data filed at 2022 1026  Gross per 24 hour   Intake 380 ml   Output --   Net 380 ml         BP (!) 134/50   Pulse 58   Temp 97.4 °F (36.3 °C) (Oral)   Resp (!) 34   Ht 5' 7.99\" (1.727 m)   Wt (!) 343 lb 14.7 oz (156 kg)   SpO2 94%   BMI 52.30 kg/m²       Physical Exam:  General:  Awake, alert, NAD  Head:normal  Eye:normal  Neck:  No JVD   Chest:  Clear to auscultation, respiration easy  Cardiovascular:  RRR S1S2  Abdomen:   nontender  Extremities: Trace edema  Pulses; palpable  Neuro: grossly normal      MEDICAL DECISION MAKING;    Pt assessed , chart reviewed, patient examined examined , all available data was reviewed, following is the plan which was discussed with CATHY as well:    -Patient is noted to be in A. fib however the heart rate is well controlled she is on Lanoxin and amiodarone ,  Patient underwent SAMMY guided cardioversion yesterday is in sinus rhythm feels much better breathing is much better  Per EP patient will be on amiodarone for rate milligrams p.o. twice daily for 7 days and then 200 mg p.o. twice daily low-dose of digoxin Toprol-XL 50 we will continue to monitor  -HFpEF patient's BNP is normal shortness of breath probably is of multiple etiology  - Patient is also has shortness of breath which is probably secondary to diastolic dysfunction secondary to HFpEF and is on diuretics i.e. torsemide  - Hypertension patient's blood pressure was on the lower side hence medications are on hold  - Peripheral vascular disease patient is also noted to have left common iliac artery and left common femoral artery stenosis however it has been managed medically for now and will be handled as an outpatient given that she has other issues  - Morbid obesity BMI is on the higher side weight loss has been discussed with her             Ji Mejia MD [facility] [place]

## 2022-11-02 ENCOUNTER — ANESTHESIA EVENT (OUTPATIENT)
Dept: ANESTHESIOLOGY | Age: 77
End: 2022-11-02

## 2022-11-02 ENCOUNTER — ANESTHESIA (OUTPATIENT)
Dept: ANESTHESIOLOGY | Age: 77
End: 2022-11-02

## 2022-11-02 LAB
EKG ATRIAL RATE: 69 BPM
EKG DIAGNOSIS: NORMAL
EKG P AXIS: 68 DEGREES
EKG P-R INTERVAL: 206 MS
EKG Q-T INTERVAL: 544 MS
EKG QRS DURATION: 78 MS
EKG QTC CALCULATION (BAZETT): 582 MS
EKG R AXIS: 27 DEGREES
EKG T AXIS: 48 DEGREES
EKG VENTRICULAR RATE: 69 BPM

## 2022-11-02 NOTE — DEATH NOTES
Death Pronouncement Note  Patient's Name: Geoffrey Rene   Patient's YOB: 1945  MRN Number: [id number]    Admitting Provider: No admitting provider for patient encounter. Attending Provider: Phil Storey MD    Patient was examined and the following were absent: Pulses, Blood Pressure, and Respiratory effort    I declared the patient dead on 11/1/2022 at 2243    Preliminary Cause of Death:   Cardiovascular collapse secondary to non-perfusing arrhythmia.     Electronically signed by Leticia Chavez MD on 11/1/22 at 11:37 PM EDT

## 2022-11-02 NOTE — ANESTHESIA PROCEDURE NOTES
Airway  Date/Time: 11/1/2022 10:39 PM  Urgency: emergent    Difficult airway    General Information and Staff    Patient location during procedure: ICU  Resident/CRNA: Guido Schlatter, APRN - CRNA  Performed: resident/CRNA     Indications and Patient Condition  Indications for airway management: cardio/pulmonary arrest  Spontaneous Ventilation: absent  Sedation level: no sedation  Preoxygenated: yes  Patient position: sniffing  MILS not maintained throughout  Mask difficulty assessment: vent by bag mask    Final Airway Details  Final airway type: endotracheal airway      Successful airway: ETT  Cuffed: yes   Successful intubation technique: video laryngoscopy  Facilitating devices/methods: intubating stylet  Blade: Ginette  Blade size: #3  ETT size (mm): 7.5  Cormack-Lehane Classification: grade I - full view of glottis  Placement verified by: chest auscultation and capnometry   Number of attempts at approach: 1  Ventilation between attempts: bag mask  Number of other approaches attempted: 0    Additional Comments  Late entry. I thought it was documented by code team.  Had been attempted twice prior by others during code blue. Patient was having daron bleeding coming out of lungs and blocking visual assessment of cords. Suzan Graham blood in the E.T, tube after intubation, suctioned fro blood while CPR is being continued.

## 2022-11-02 NOTE — H&P
[employer] called at this time. Referral number: [id number]    Waiting for call back.      Electronically signed by Kayla Quezada RN on 11/1/2022 at 10:55 PM

## 2022-11-02 NOTE — PROGRESS NOTES
Pt had been complaining of not feeling good during day shift. Pt was having bigeminy and the DrCarmelina Was notified per day shift report. Pt. Complained of dizziness at the beginning of shift. This RN did EKG which showed sinus rhythm. Pt was alert and oriented said she was feeling ok. Got a phone call that pt was in v-fib, rapid response and code blue called. Family member (daughter) was also notified.

## 2022-11-02 NOTE — PROGRESS NOTES
[employer] called at this time. Referral number: [id number]    Waiting for call back.      Electronically signed by Pat Gonzalez RN on 11/1/2022 at 10:55 PM

## 2022-11-02 NOTE — SIGNIFICANT EVENT
Code blue called at approximately 10:10 pm. I entered the room 1 minute after code was called overhead. Compressions and 1mg epinephrine already given by the time I arrived. On first pulse check patient was in vfib. Defib at 200 joules administered and 2 minutes of compressions followed. Second pulse check with no pulse and persistent vfib. Second 200 joules shock administered, 1 mg epi administered, 2 minutes of compressions followed. Third pulse check with thready pulse and patient opening eyes with spontaneous respirations. Approximately 1 minute later pulse was no longer detectable and PEA algorithm initiated. 1mg epinephrine given and 2 minutes of compressions followed. Fourth pulse check with PEA with torsade morphology on monitor. No pulse detected. 2g IV push of magnesium given and pulseless vtach algorithm initiated. 200 joules shock delivered and 2 minutes of compressions followed. Fifth pulse check with vfib. 300mg amiodarone given. 2 minutes of compression followed. In total 150 mEq of bicarb in 3 separate boluses were given approximately every 8 minutes for the first 25 minutes. 6th pulse check with asystole. PEA algorithm initiated and continued until the 35 minute sumit at which point a final pulse check showed persistent asystole and code was ended with care team consensus. This code was complicated by a difficult airway requiring multiple intubation attempts. High Mallampati class and airway hemorrhage made visualization of cords very challenging. Airway was eventually secured at the 25 minute sumit. Further resuscitation efforts were futile and patient time of death called at 10:43 pm on 11/1/2022.     Cloton Rich MD

## 2022-11-02 NOTE — PROGRESS NOTES
All belongings given to daughter Jt at this time. Family leaving bedside at this time and no questions. Thanked staff \"for the great care\".  Family requesting body to go to [place], [place].

## 2022-11-15 NOTE — DISCHARGE SUMMARY
V2.0  Death Summary    Name:  Kenya Vázquez /Age/Sex: 1945 (70 y.o. female)   Admit Date: 10/24/2022  Discharge Date: 11/15/22    MRN & CSN:  [id number] & [id number] Encounter Date and Time 11/15/22 7:08 AM EST    Attending:  No att. providers found Discharging Provider: Jg Simmons MD       Hospital Course:     Brief HPI: Kenya Vázquez is a 67 y.o. female who presented with atrial fibrillation with RVR. Patient underwent SAMMY cardioversion and reverted to sinus rhythm with controlled heart rate. Patient was kept overnight after her SAMMY/cardioversion procedure for monitoring. Patient was noted in V. fib cardiac arrest on  at 2210 hrs. underwent CPR but was unsuccessful in achieving ROSC. Please see detailed death note by Dr. Alan Horn dated  at 2337 hrs. Patient was pronounced dead on 2022 at 2243 hrs. The patient expressed appropriate understanding of, and agreement with the discharge recommendations, medications, and plan.      Consults this admission:  IP CONSULT TO HOSPITALIST  IP CONSULT TO CARDIOLOGY  IP CONSULT TO ELECTROPHYSIOLOGY    Discharge Diagnosis:   Atrial fibrillation with rapid ventricular response ([facility] [address]  V. fib cardiac arrest      Discharge Instruction:   Not applicable    Discharge Medications:        Medication List        START taking these medications      amiodarone 400 MG tablet  Commonly known as: PACERONE  Take 1 tablet by mouth 2 times daily     apixaban 5 MG Tabs tablet  Commonly known as: ELIQUIS  Take 1 tablet by mouth 2 times daily     digoxin 125 MCG tablet  Commonly known as: LANOXIN  Take 1 tablet by mouth daily     glipiZIDE 10 MG tablet  Commonly known as: GLUCOTROL  Take 1 tablet by mouth 2 times daily (before meals)     magnesium oxide 400 (240 Mg) MG tablet  Commonly known as: MAG-OX  Take 1 tablet by mouth daily     metoprolol succinate 50 MG extended release tablet  Commonly known as: TOPROL XL  Take 1 tablet by mouth daily     Torsemide 40 Temp 97.8 °F (36.6 °C) (Oral)   Resp 28   Ht 5' 7.99\" (1.727 m)   Wt (!) 343 lb 14.7 oz (156 kg)   SpO2 95%   BMI 52.30 kg/m²       Physical Exam:   Physical Exam         Labs and Imaging   XR CHEST PORTABLE    Result Date: 10/25/2022  EXAMINATION: ONE XRAY VIEW OF THE CHEST 10/24/2022 3:26 pm COMPARISON: 2/10/2020 HISTORY: ORDERING SYSTEM PROVIDED HISTORY: sob TECHNOLOGIST PROVIDED HISTORY: Reason for exam:->sob Reason for Exam: sob FINDINGS: The heart size is enlarged. There is no pneumothorax, edema or focal consolidation. The bones are demineralized. Patient body habitus limits the exam.     Mild cardiac silhouette enlargement. No evidence of edema or pneumonia. CBC: No results for input(s): WBC, HGB, PLT in the last 72 hours. BMP:  No results for input(s): NA, K, CL, CO2, BUN, CREATININE, GLUCOSE in the last 72 hours. Hepatic: No results for input(s): AST, ALT, ALB, BILITOT, ALKPHOS in the last 72 hours. Lipids:   Lab Results   Component Value Date/Time    CHOL 111 10/22/2013 09:59 AM    HDL 39 10/22/2013 09:59 AM    TRIG 183 10/22/2013 09:59 AM     Hemoglobin A1C:   Lab Results   Component Value Date/Time    LABA1C 5.8 10/24/2022 05:05 PM     TSH: No results found for: TSH  Troponin:   Lab Results   Component Value Date/Time    TROPONINT <0.010 10/25/2022 07:54 AM    TROPONINT <0.010 10/24/2022 10:16 PM    TROPONINT <0.010 10/24/2022 05:05 PM     Lactic Acid: No results for input(s): LACTA in the last 72 hours. BNP: No results for input(s): PROBNP in the last 72 hours.   UA:No results found for: Martha Bouquet, PHUR, LABCAST, WBCUA, RBCUA, MUCUS, TRICHOMONAS, YEAST, BACTERIA, CLARITYU, SPECGRAV, LEUKOCYTESUR, UROBILINOGEN, BILIRUBINUR, BLOODU, GLUCOSEU, KETUA, AMORPHOUS  Urine Cultures: No results found for: LABURIN  Blood Cultures: No results found for: BC  No results found for: BLOODCULT2  Organism: No results found for: [facility]        Electronically signed by Juan Simental MD on 11/15/2022 at 7:08 AM

## 2022-11-15 NOTE — PROGRESS NOTES
Physician Progress Note      PATIENTVerkatherine Dumont  [facility] #:                  [id number]  :                       1945  ADMIT DATE:       10/24/2022 2:44 PM  [address] [place] DATE:        2022 10:43 PM  RESPONDING  PROVIDER #:        Jasen Gramajo MD          QUERY TEXT:    Pt admitted with afib-with rvr. Pt noted to have intubation attempts. If   possible, please document in the progress notes and discharge summary if you   are evaluating and/or treating any of the following: The medical record reflects the following:  Risk Factors: V-fib/asystole  Clinical Indicators: Significant event note pt in PEA had attempt for   intubation. On home O2 of 2L/nc for COPD. Treatment: CPR, intubation attempts, code blue. Thank you,  MARU Hale, RN, CDS  [phone]  Options provided:  -- Acute on chronic respiratory failure with hypoxia  -- Other - I will add my own diagnosis  -- Disagree - Not applicable / Not valid  -- Disagree - Clinically unable to determine / Unknown  -- Refer to Clinical Documentation Reviewer    PROVIDER RESPONSE TEXT:    Provider disagreed with this query. I was not present at the time of the code event so unable to comment, will   refer to the person who was present at the code event.     Query created by: Jared Be on 2022 7:04 AM      Electronically signed by:  Jasen Gramajo MD 11/15/2022 7:06 AM

## 2022-11-18 NOTE — PROGRESS NOTES
Physician Progress Note      Nii Herrera  [facility] #:                  [id number]  :                       1945  ADMIT DATE:       10/24/2022 2:44 PM  [address] DATE:        2022 10:43 PM  RESPONDING  PROVIDER #:        Roberta Tesfaye MD          QUERY TEXT:    Pt admitted with afib-with rvr. Pt noted to have intubation attempts. If   possible, please document in the progress notes and discharge summary if you   are evaluating and/or treating any of the following: The medical record reflects the following:  Risk Factors: V-fib/asystole  Clinical Indicators: Significant event note pt in PEA had attempt for   intubation. On home O2 of 2L/nc for COPD. Treatment: CPR, intubation attempts, code blue. Thank you,  DEL RebolledoN, RN, CDS  [phone]  Options provided:  -- Acute on chronic respiratory failure with hypoxia  -- Other - I will add my own diagnosis  -- Disagree - Not applicable / Not valid  -- Disagree - Clinically unable to determine / Unknown  -- Refer to Clinical Documentation Reviewer    PROVIDER RESPONSE TEXT:    This patient is in acute on chronic respiratory failure with hypoxia.     Query created by: Kwasi German on 2022 7:53 AM      Electronically signed by:  Roberta Tesfaye MD 2022 12:52 PM